# Patient Record
Sex: MALE | Race: WHITE
[De-identification: names, ages, dates, MRNs, and addresses within clinical notes are randomized per-mention and may not be internally consistent; named-entity substitution may affect disease eponyms.]

---

## 2017-07-20 ENCOUNTER — APPOINTMENT (OUTPATIENT)
Dept: INTERNAL MEDICINE | Facility: CLINIC | Age: 52
End: 2017-07-20

## 2017-07-20 ENCOUNTER — RECORD ABSTRACTING (OUTPATIENT)
Age: 52
End: 2017-07-20

## 2017-07-20 VITALS
RESPIRATION RATE: 18 BRPM | HEIGHT: 71 IN | BODY MASS INDEX: 30.52 KG/M2 | WEIGHT: 218 LBS | DIASTOLIC BLOOD PRESSURE: 82 MMHG | OXYGEN SATURATION: 97 % | SYSTOLIC BLOOD PRESSURE: 110 MMHG | TEMPERATURE: 98 F | HEART RATE: 80 BPM

## 2017-07-20 VITALS — DIASTOLIC BLOOD PRESSURE: 76 MMHG | SYSTOLIC BLOOD PRESSURE: 126 MMHG

## 2017-07-20 DIAGNOSIS — J98.4 OTHER DISORDERS OF LUNG: ICD-10-CM

## 2017-07-20 DIAGNOSIS — J06.9 ACUTE UPPER RESPIRATORY INFECTION, UNSPECIFIED: ICD-10-CM

## 2017-07-20 DIAGNOSIS — E83.19 OTHER DISORDERS OF IRON METABOLISM: ICD-10-CM

## 2017-07-20 DIAGNOSIS — G47.33 OBSTRUCTIVE SLEEP APNEA (ADULT) (PEDIATRIC): ICD-10-CM

## 2017-07-20 DIAGNOSIS — K57.92 DIVERTICULITIS OF INTESTINE, PART UNSPECIFIED, W/OUT PERFORATION OR ABSCESS W/OUT BLEEDING: ICD-10-CM

## 2017-07-20 DIAGNOSIS — R49.0 DYSPHONIA: ICD-10-CM

## 2017-07-20 DIAGNOSIS — M77.11 LATERAL EPICONDYLITIS, RIGHT ELBOW: ICD-10-CM

## 2017-07-20 DIAGNOSIS — K76.0 FATTY (CHANGE OF) LIVER, NOT ELSEWHERE CLASSIFIED: ICD-10-CM

## 2017-07-20 DIAGNOSIS — I10 ESSENTIAL (PRIMARY) HYPERTENSION: ICD-10-CM

## 2017-07-20 DIAGNOSIS — F17.200 NICOTINE DEPENDENCE, UNSPECIFIED, UNCOMPLICATED: ICD-10-CM

## 2017-07-20 DIAGNOSIS — E88.01 ALPHA-1-ANTITRYPSIN DEFICIENCY: ICD-10-CM

## 2017-07-20 DIAGNOSIS — M54.5 LOW BACK PAIN: ICD-10-CM

## 2017-07-20 DIAGNOSIS — E78.5 HYPERLIPIDEMIA, UNSPECIFIED: ICD-10-CM

## 2017-07-20 DIAGNOSIS — R09.02 HYPOXEMIA: ICD-10-CM

## 2017-07-20 DIAGNOSIS — G47.00 INSOMNIA, UNSPECIFIED: ICD-10-CM

## 2017-07-20 DIAGNOSIS — R91.1 SOLITARY PULMONARY NODULE: ICD-10-CM

## 2017-07-20 DIAGNOSIS — K44.9 DIAPHRAGMATIC HERNIA W/OUT OBSTRUCTION OR GANGRENE: ICD-10-CM

## 2017-07-20 DIAGNOSIS — I80.9 PHLEBITIS AND THROMBOPHLEBITIS OF UNSPECIFIED SITE: ICD-10-CM

## 2017-07-20 DIAGNOSIS — K21.9 GASTRO-ESOPHAGEAL REFLUX DISEASE W/OUT ESOPHAGITIS: ICD-10-CM

## 2017-07-20 RX ORDER — ATORVASTATIN CALCIUM 10 MG/1
10 TABLET, FILM COATED ORAL
Refills: 0 | Status: COMPLETED | COMMUNITY
End: 2017-07-20

## 2018-01-08 RX ORDER — UBIDECARENONE/VIT E ACET 100MG-5
CAPSULE ORAL
Refills: 0 | Status: ACTIVE | COMMUNITY

## 2018-01-08 RX ORDER — ATORVASTATIN CALCIUM 80 MG/1
80 TABLET, FILM COATED ORAL
Qty: 30 | Refills: 0 | Status: ACTIVE | COMMUNITY
Start: 2016-12-21

## 2018-01-08 RX ORDER — CHROMIUM 200 MCG
TABLET ORAL
Refills: 0 | Status: ACTIVE | COMMUNITY

## 2018-01-09 ENCOUNTER — APPOINTMENT (OUTPATIENT)
Dept: INTERNAL MEDICINE | Facility: CLINIC | Age: 53
End: 2018-01-09

## 2018-01-09 DIAGNOSIS — Z90.49 ACQUIRED ABSENCE OF OTHER SPECIFIED PARTS OF DIGESTIVE TRACT: ICD-10-CM

## 2018-03-05 ENCOUNTER — RX RENEWAL (OUTPATIENT)
Age: 53
End: 2018-03-05

## 2018-04-25 ENCOUNTER — EMERGENCY (EMERGENCY)
Facility: HOSPITAL | Age: 53
LOS: 0 days | Discharge: HOME | End: 2018-04-25
Attending: EMERGENCY MEDICINE | Admitting: EMERGENCY MEDICINE
Payer: OTHER MISCELLANEOUS

## 2018-04-25 VITALS — WEIGHT: 210.1 LBS

## 2018-04-25 VITALS
RESPIRATION RATE: 18 BRPM | HEART RATE: 91 BPM | TEMPERATURE: 98 F | DIASTOLIC BLOOD PRESSURE: 94 MMHG | SYSTOLIC BLOOD PRESSURE: 135 MMHG | OXYGEN SATURATION: 100 %

## 2018-04-25 DIAGNOSIS — M54.32 SCIATICA, LEFT SIDE: ICD-10-CM

## 2018-04-25 DIAGNOSIS — M54.9 DORSALGIA, UNSPECIFIED: ICD-10-CM

## 2018-04-25 PROCEDURE — 99284 EMERGENCY DEPT VISIT MOD MDM: CPT

## 2018-04-25 PROCEDURE — 72100 X-RAY EXAM L-S SPINE 2/3 VWS: CPT | Mod: 26

## 2018-04-25 RX ORDER — CYCLOBENZAPRINE HYDROCHLORIDE 10 MG/1
1 TABLET, FILM COATED ORAL
Qty: 15 | Refills: 0 | OUTPATIENT
Start: 2018-04-25 | End: 2018-04-29

## 2018-04-25 RX ORDER — HYDROCODONE BITARTRATE AND ACETAMINOPHEN 7.5; 325 MG/15ML; MG/15ML
1 SOLUTION ORAL
Qty: 12 | Refills: 0
Start: 2018-04-25 | End: 2018-04-27

## 2018-04-25 RX ORDER — OXYCODONE AND ACETAMINOPHEN 5; 325 MG/1; MG/1
2 TABLET ORAL ONCE
Qty: 0 | Refills: 0 | Status: DISCONTINUED | OUTPATIENT
Start: 2018-04-25 | End: 2018-04-25

## 2018-04-25 RX ORDER — CYCLOBENZAPRINE HYDROCHLORIDE 10 MG/1
1 TABLET, FILM COATED ORAL
Qty: 15 | Refills: 0
Start: 2018-04-25 | End: 2018-04-29

## 2018-04-25 RX ORDER — CYCLOBENZAPRINE HYDROCHLORIDE 10 MG/1
10 TABLET, FILM COATED ORAL ONCE
Qty: 0 | Refills: 0 | Status: COMPLETED | OUTPATIENT
Start: 2018-04-25 | End: 2018-04-25

## 2018-04-25 NOTE — ED STATDOCS - OBJECTIVE STATEMENT
52 y/o M with Hx of HTN, HLD, borderline DM,  presents to ED for evaluation of back pain. Pt states while at work today he was helping move an 800lb boiler down the stairs and suddenly he felt lower back pain. He also reports while moving this boiler he became SOB, and felt lightheaded which has since resolved. Pain exacerbated by certain movements. Pt though is able to ambulate. No radiation of pain but pt notes he felt a burning sensation in his left buttock. No Hx of back surgeries. No associated weakness, or incontinence. 3 200mg Ibuprofen taken PTA. Cardio is Kle[facility]. No current SOB or CP.

## 2018-04-25 NOTE — ED PROVIDER NOTE - ATTENDING CONTRIBUTION TO CARE
I, Milad Hubbard MD, personally saw the patient with ACP.  I have personally performed a face to face diagnostic evaluation on this patient.  I have reviewed the ACP note and agree with the history, exam, and plan of care, except as noted.

## 2018-04-25 NOTE — ED ADULT TRIAGE NOTE - CHIEF COMPLAINT QUOTE
pt states he lifted an 800lb boiler at his job and hurt his lwor back, 8/10 pan, pain with movement/ambulation, pt able to walk and drive but with discomfort, no neuro changes, no sensation changes

## 2018-04-25 NOTE — ED PROVIDER NOTE - PROGRESS NOTE DETAILS
HPI: 54yo M with PMHx of HTN, HLD, borderline DM, heart valve presents for back pain after lifting a heavy boiler down stairs at work 2 hours ago. He became SOB and lightheaded and felt sudden pain in low back that he has never had before. He also admits to L buttock burning. He iced his back and took three 200mg motrin with mild relief. The pain is exacerbated with movement. SOB and lightheadedness has resolved and he is able to bear weight and walk. He has not had the urge to urinate since the incident. Denies weakness, numbness/tingling down legs or in genital region, urinary hesitancy or incontinence. Denies chest pain, dyspnea   General: NAD, A&Ox3 Heart: RRR Lungs: clear to auscultation b/l MSK: weight bearing, normal gait, mild TTP lumbar spine, no redness, swelling, spinal deviation limited flexion due to pain Attending Haydee: PE: AAOx3 NAD, Cardiac S1S2 no mrg, Resp CTAB, Abd soft NTND, Neuro no focal deficits MSK: +lumbar ttp, ambulating well, nl bilat LE strength/sensation

## 2018-04-25 NOTE — ED PROVIDER NOTE - CARE PLAN
Principal Discharge DX:	Acute bilateral low back pain with left-sided sciatica  Goal:	medication and discharge

## 2018-09-04 ENCOUNTER — RX RENEWAL (OUTPATIENT)
Age: 53
End: 2018-09-04

## 2018-09-04 RX ORDER — RAMIPRIL 10 MG/1
10 CAPSULE ORAL DAILY
Qty: 90 | Refills: 1 | Status: ACTIVE | COMMUNITY
Start: 2018-03-05 | End: 1900-01-01

## 2019-02-16 NOTE — ED STATDOCS - PSH
Bedside shift change report given to [address] [place] [address] (oncoming nurse) by [address] (offgoing nurse). Report included the following information SBAR and Kardex. No significant past surgical history

## 2019-03-04 ENCOUNTER — RX RENEWAL (OUTPATIENT)
Age: 54
End: 2019-03-04

## 2019-03-26 ENCOUNTER — RX RENEWAL (OUTPATIENT)
Age: 54
End: 2019-03-26

## 2019-08-12 ENCOUNTER — APPOINTMENT (OUTPATIENT)
Dept: DERMATOLOGY | Facility: CLINIC | Age: 54
End: 2019-08-12

## 2020-05-06 NOTE — ED STATDOCS - CPE ED ENMT NORM
Med:  oxcrbazepine  -  Last filled on 3/23/2020 for 270 with 0 refills    LOV:  5/16/19    FUV:  5/21/2020    Patient has mail order.  Refills given per protocol.  
normal...

## 2020-12-15 PROBLEM — J06.9 UPPER RESPIRATORY INFECTION, ACUTE: Status: RESOLVED | Noted: 2017-07-20 | Resolved: 2020-12-15

## 2021-10-06 PROBLEM — I10 ESSENTIAL HYPERTENSION: Status: ACTIVE | Noted: 2017-07-20

## 2022-06-13 PROBLEM — E78.5 HYPERLIPIDEMIA, UNSPECIFIED: Chronic | Status: ACTIVE | Noted: 2018-04-26

## 2022-06-13 PROBLEM — I10 ESSENTIAL (PRIMARY) HYPERTENSION: Chronic | Status: ACTIVE | Noted: 2018-04-26

## 2022-06-17 ENCOUNTER — OUTPATIENT (OUTPATIENT)
Dept: OUTPATIENT SERVICES | Facility: HOSPITAL | Age: 57
LOS: 1 days | End: 2022-06-17
Payer: COMMERCIAL

## 2022-06-17 DIAGNOSIS — R13.19 OTHER DYSPHAGIA: ICD-10-CM

## 2022-06-17 DIAGNOSIS — R63.4 ABNORMAL WEIGHT LOSS: ICD-10-CM

## 2022-06-17 PROCEDURE — 74220 X-RAY XM ESOPHAGUS 1CNTRST: CPT | Mod: 26

## 2022-06-17 PROCEDURE — 74220 X-RAY XM ESOPHAGUS 1CNTRST: CPT

## 2022-06-18 DIAGNOSIS — R63.4 ABNORMAL WEIGHT LOSS: ICD-10-CM

## 2022-06-18 DIAGNOSIS — R13.19 OTHER DYSPHAGIA: ICD-10-CM

## 2024-06-01 ENCOUNTER — INPATIENT (INPATIENT)
Facility: HOSPITAL | Age: 59
LOS: 3 days | Discharge: HOME | DRG: 183 | End: 2024-06-05
Attending: SURGERY | Admitting: SURGERY
Payer: COMMERCIAL

## 2024-06-01 VITALS
TEMPERATURE: 99 F | HEART RATE: 104 BPM | RESPIRATION RATE: 18 BRPM | DIASTOLIC BLOOD PRESSURE: 94 MMHG | WEIGHT: 160.72 LBS | OXYGEN SATURATION: 89 % | SYSTOLIC BLOOD PRESSURE: 194 MMHG

## 2024-06-01 DIAGNOSIS — S22.39XA FRACTURE OF ONE RIB, UNSPECIFIED SIDE, INITIAL ENCOUNTER FOR CLOSED FRACTURE: ICD-10-CM

## 2024-06-01 DIAGNOSIS — Z90.49 ACQUIRED ABSENCE OF OTHER SPECIFIED PARTS OF DIGESTIVE TRACT: Chronic | ICD-10-CM

## 2024-06-01 DIAGNOSIS — M25.519 PAIN IN UNSPECIFIED SHOULDER: Chronic | ICD-10-CM

## 2024-06-01 LAB
ALBUMIN SERPL ELPH-MCNC: 4.1 G/DL — SIGNIFICANT CHANGE UP (ref 3.3–5)
ALP SERPL-CCNC: 106 U/L — SIGNIFICANT CHANGE UP (ref 40–120)
ALT FLD-CCNC: 69 U/L — SIGNIFICANT CHANGE UP (ref 12–78)
ANION GAP SERPL CALC-SCNC: 10 MMOL/L — SIGNIFICANT CHANGE UP (ref 5–17)
AST SERPL-CCNC: 25 U/L — SIGNIFICANT CHANGE UP (ref 15–37)
BASOPHILS # BLD AUTO: 0.05 K/UL — SIGNIFICANT CHANGE UP (ref 0–0.2)
BASOPHILS NFR BLD AUTO: 0.3 % — SIGNIFICANT CHANGE UP (ref 0–2)
BILIRUB SERPL-MCNC: 1 MG/DL — SIGNIFICANT CHANGE UP (ref 0.2–1.2)
BUN SERPL-MCNC: 20 MG/DL — SIGNIFICANT CHANGE UP (ref 7–23)
CALCIUM SERPL-MCNC: 9.3 MG/DL — SIGNIFICANT CHANGE UP (ref 8.5–10.1)
CHLORIDE SERPL-SCNC: 109 MMOL/L — HIGH (ref 96–108)
CO2 SERPL-SCNC: 20 MMOL/L — LOW (ref 22–31)
CREAT SERPL-MCNC: 0.8 MG/DL — SIGNIFICANT CHANGE UP (ref 0.5–1.3)
EGFR: 101 ML/MIN/1.73M2 — SIGNIFICANT CHANGE UP
EOSINOPHIL # BLD AUTO: 0.09 K/UL — SIGNIFICANT CHANGE UP (ref 0–0.5)
EOSINOPHIL NFR BLD AUTO: 0.5 % — SIGNIFICANT CHANGE UP (ref 0–6)
GLUCOSE SERPL-MCNC: 142 MG/DL — HIGH (ref 70–99)
HCT VFR BLD CALC: 46.6 % — SIGNIFICANT CHANGE UP (ref 39–50)
HGB BLD-MCNC: 15.8 G/DL — SIGNIFICANT CHANGE UP (ref 13–17)
IMM GRANULOCYTES NFR BLD AUTO: 0.5 % — SIGNIFICANT CHANGE UP (ref 0–0.9)
LACTATE SERPL-SCNC: 0.9 MMOL/L — SIGNIFICANT CHANGE UP (ref 0.7–2)
LYMPHOCYTES # BLD AUTO: 1.14 K/UL — SIGNIFICANT CHANGE UP (ref 1–3.3)
LYMPHOCYTES # BLD AUTO: 6 % — LOW (ref 13–44)
MACROCYTES BLD QL: SLIGHT — SIGNIFICANT CHANGE UP
MAGNESIUM SERPL-MCNC: 2 MG/DL — SIGNIFICANT CHANGE UP (ref 1.6–2.6)
MANUAL SMEAR VERIFICATION: SIGNIFICANT CHANGE UP
MCHC RBC-ENTMCNC: 33.1 PG — SIGNIFICANT CHANGE UP (ref 27–34)
MCHC RBC-ENTMCNC: 33.9 GM/DL — SIGNIFICANT CHANGE UP (ref 32–36)
MCV RBC AUTO: 97.5 FL — SIGNIFICANT CHANGE UP (ref 80–100)
MONOCYTES # BLD AUTO: 1.65 K/UL — HIGH (ref 0–0.9)
MONOCYTES NFR BLD AUTO: 8.7 % — SIGNIFICANT CHANGE UP (ref 2–14)
NEUTROPHILS # BLD AUTO: 15.95 K/UL — HIGH (ref 1.8–7.4)
NEUTROPHILS NFR BLD AUTO: 84 % — HIGH (ref 43–77)
NT-PROBNP SERPL-SCNC: 218 PG/ML — HIGH (ref 0–125)
PLAT MORPH BLD: NORMAL — SIGNIFICANT CHANGE UP
PLATELET # BLD AUTO: 153 K/UL — SIGNIFICANT CHANGE UP (ref 150–400)
POTASSIUM SERPL-MCNC: 4 MMOL/L — SIGNIFICANT CHANGE UP (ref 3.5–5.3)
POTASSIUM SERPL-SCNC: 4 MMOL/L — SIGNIFICANT CHANGE UP (ref 3.5–5.3)
PROT SERPL-MCNC: 7.3 GM/DL — SIGNIFICANT CHANGE UP (ref 6–8.3)
RBC # BLD: 4.78 M/UL — SIGNIFICANT CHANGE UP (ref 4.2–5.8)
RBC # FLD: 14.7 % — HIGH (ref 10.3–14.5)
RBC BLD AUTO: ABNORMAL
SODIUM SERPL-SCNC: 139 MMOL/L — SIGNIFICANT CHANGE UP (ref 135–145)
TROPONIN I, HIGH SENSITIVITY RESULT: 7.22 NG/L — SIGNIFICANT CHANGE UP
WBC # BLD: 18.97 K/UL — HIGH (ref 3.8–10.5)
WBC # FLD AUTO: 18.97 K/UL — HIGH (ref 3.8–10.5)

## 2024-06-01 PROCEDURE — 81003 URINALYSIS AUTO W/O SCOPE: CPT

## 2024-06-01 PROCEDURE — 83735 ASSAY OF MAGNESIUM: CPT

## 2024-06-01 PROCEDURE — 93010 ELECTROCARDIOGRAM REPORT: CPT

## 2024-06-01 PROCEDURE — 80048 BASIC METABOLIC PNL TOTAL CA: CPT

## 2024-06-01 PROCEDURE — 0225U NFCT DS DNA&RNA 21 SARSCOV2: CPT

## 2024-06-01 PROCEDURE — 94760 N-INVAS EAR/PLS OXIMETRY 1: CPT

## 2024-06-01 PROCEDURE — 74176 CT ABD & PELVIS W/O CONTRAST: CPT | Mod: MC

## 2024-06-01 PROCEDURE — 71275 CT ANGIOGRAPHY CHEST: CPT | Mod: 26,MC

## 2024-06-01 PROCEDURE — 36415 COLL VENOUS BLD VENIPUNCTURE: CPT

## 2024-06-01 PROCEDURE — 99223 1ST HOSP IP/OBS HIGH 75: CPT

## 2024-06-01 PROCEDURE — 80053 COMPREHEN METABOLIC PANEL: CPT

## 2024-06-01 PROCEDURE — 97116 GAIT TRAINING THERAPY: CPT | Mod: GP

## 2024-06-01 PROCEDURE — 85025 COMPLETE CBC W/AUTO DIFF WBC: CPT

## 2024-06-01 PROCEDURE — 94640 AIRWAY INHALATION TREATMENT: CPT

## 2024-06-01 PROCEDURE — 85027 COMPLETE CBC AUTOMATED: CPT

## 2024-06-01 PROCEDURE — 71045 X-RAY EXAM CHEST 1 VIEW: CPT

## 2024-06-01 PROCEDURE — 99285 EMERGENCY DEPT VISIT HI MDM: CPT

## 2024-06-01 PROCEDURE — 84100 ASSAY OF PHOSPHORUS: CPT

## 2024-06-01 PROCEDURE — 71045 X-RAY EXAM CHEST 1 VIEW: CPT | Mod: 26

## 2024-06-01 PROCEDURE — 82962 GLUCOSE BLOOD TEST: CPT

## 2024-06-01 PROCEDURE — 83036 HEMOGLOBIN GLYCOSYLATED A1C: CPT

## 2024-06-01 RX ORDER — HEPARIN SODIUM 5000 [USP'U]/ML
5000 INJECTION INTRAVENOUS; SUBCUTANEOUS EVERY 8 HOURS
Refills: 0 | Status: DISCONTINUED | OUTPATIENT
Start: 2024-06-01 | End: 2024-06-05

## 2024-06-01 RX ORDER — DEXTROSE 10 % IN WATER 10 %
125 INTRAVENOUS SOLUTION INTRAVENOUS ONCE
Refills: 0 | Status: DISCONTINUED | OUTPATIENT
Start: 2024-06-01 | End: 2024-06-05

## 2024-06-01 RX ORDER — HYDROMORPHONE HYDROCHLORIDE 2 MG/ML
0.5 INJECTION INTRAMUSCULAR; INTRAVENOUS; SUBCUTANEOUS EVERY 6 HOURS
Refills: 0 | Status: DISCONTINUED | OUTPATIENT
Start: 2024-06-01 | End: 2024-06-02

## 2024-06-01 RX ORDER — RAMIPRIL 5 MG
1 CAPSULE ORAL
Qty: 0 | Refills: 0 | DISCHARGE

## 2024-06-01 RX ORDER — GABAPENTIN 400 MG/1
100 CAPSULE ORAL EVERY 8 HOURS
Refills: 0 | Status: DISCONTINUED | OUTPATIENT
Start: 2024-06-01 | End: 2024-06-05

## 2024-06-01 RX ORDER — KETOROLAC TROMETHAMINE 30 MG/ML
15 SYRINGE (ML) INJECTION ONCE
Refills: 0 | Status: DISCONTINUED | OUTPATIENT
Start: 2024-06-01 | End: 2024-06-01

## 2024-06-01 RX ORDER — DEXTROSE 50 % IN WATER 50 %
12.5 SYRINGE (ML) INTRAVENOUS ONCE
Refills: 0 | Status: DISCONTINUED | OUTPATIENT
Start: 2024-06-01 | End: 2024-06-05

## 2024-06-01 RX ORDER — INSULIN GLARGINE 100 [IU]/ML
10 INJECTION, SOLUTION SUBCUTANEOUS EVERY MORNING
Refills: 0 | Status: DISCONTINUED | OUTPATIENT
Start: 2024-06-01 | End: 2024-06-05

## 2024-06-01 RX ORDER — DEXTROSE 50 % IN WATER 50 %
15 SYRINGE (ML) INTRAVENOUS ONCE
Refills: 0 | Status: DISCONTINUED | OUTPATIENT
Start: 2024-06-01 | End: 2024-06-05

## 2024-06-01 RX ORDER — ATORVASTATIN CALCIUM 80 MG/1
80 TABLET, FILM COATED ORAL AT BEDTIME
Refills: 0 | Status: DISCONTINUED | OUTPATIENT
Start: 2024-06-01 | End: 2024-06-05

## 2024-06-01 RX ORDER — SODIUM CHLORIDE 9 MG/ML
1000 INJECTION INTRAMUSCULAR; INTRAVENOUS; SUBCUTANEOUS ONCE
Refills: 0 | Status: COMPLETED | OUTPATIENT
Start: 2024-06-01 | End: 2024-06-01

## 2024-06-01 RX ORDER — LIDOCAINE 4 G/100G
1 CREAM TOPICAL EVERY 24 HOURS
Refills: 0 | Status: DISCONTINUED | OUTPATIENT
Start: 2024-06-01 | End: 2024-06-05

## 2024-06-01 RX ORDER — LOSARTAN POTASSIUM 100 MG/1
25 TABLET, FILM COATED ORAL DAILY
Refills: 0 | Status: DISCONTINUED | OUTPATIENT
Start: 2024-06-01 | End: 2024-06-05

## 2024-06-01 RX ORDER — IBUPROFEN 200 MG
600 TABLET ORAL EVERY 8 HOURS
Refills: 0 | Status: DISCONTINUED | OUTPATIENT
Start: 2024-06-01 | End: 2024-06-02

## 2024-06-01 RX ORDER — INSULIN LISPRO 100/ML
VIAL (ML) SUBCUTANEOUS
Refills: 0 | Status: DISCONTINUED | OUTPATIENT
Start: 2024-06-01 | End: 2024-06-05

## 2024-06-01 RX ORDER — MORPHINE SULFATE 50 MG/1
4 CAPSULE, EXTENDED RELEASE ORAL ONCE
Refills: 0 | Status: DISCONTINUED | OUTPATIENT
Start: 2024-06-01 | End: 2024-06-01

## 2024-06-01 RX ORDER — ATORVASTATIN CALCIUM 80 MG/1
1 TABLET, FILM COATED ORAL
Qty: 0 | Refills: 0 | DISCHARGE

## 2024-06-01 RX ORDER — SODIUM CHLORIDE 9 MG/ML
1000 INJECTION, SOLUTION INTRAVENOUS
Refills: 0 | Status: DISCONTINUED | OUTPATIENT
Start: 2024-06-01 | End: 2024-06-05

## 2024-06-01 RX ORDER — OLMESARTAN MEDOXOMIL 5 MG/1
2 TABLET, FILM COATED ORAL
Refills: 0 | DISCHARGE

## 2024-06-01 RX ORDER — NICOTINE POLACRILEX 2 MG
1 GUM BUCCAL DAILY
Refills: 0 | Status: DISCONTINUED | OUTPATIENT
Start: 2024-06-01 | End: 2024-06-05

## 2024-06-01 RX ORDER — UBIDECARENONE 100 MG
1 CAPSULE ORAL
Refills: 0 | DISCHARGE

## 2024-06-01 RX ORDER — EMPAGLIFLOZIN 10 MG/1
1 TABLET, FILM COATED ORAL
Refills: 0 | DISCHARGE

## 2024-06-01 RX ORDER — CHOLECALCIFEROL (VITAMIN D3) 125 MCG
1 CAPSULE ORAL
Refills: 0 | DISCHARGE

## 2024-06-01 RX ORDER — CYCLOBENZAPRINE HYDROCHLORIDE 10 MG/1
5 TABLET, FILM COATED ORAL THREE TIMES A DAY
Refills: 0 | Status: DISCONTINUED | OUTPATIENT
Start: 2024-06-01 | End: 2024-06-02

## 2024-06-01 RX ORDER — DEXTROSE 50 % IN WATER 50 %
25 SYRINGE (ML) INTRAVENOUS ONCE
Refills: 0 | Status: DISCONTINUED | OUTPATIENT
Start: 2024-06-01 | End: 2024-06-05

## 2024-06-01 RX ORDER — ATORVASTATIN CALCIUM 80 MG/1
1 TABLET, FILM COATED ORAL
Refills: 0 | DISCHARGE

## 2024-06-01 RX ORDER — SITAGLIPTIN 50 MG/1
1 TABLET, FILM COATED ORAL
Refills: 0 | DISCHARGE

## 2024-06-01 RX ORDER — GLUCAGON INJECTION, SOLUTION 0.5 MG/.1ML
1 INJECTION, SOLUTION SUBCUTANEOUS ONCE
Refills: 0 | Status: DISCONTINUED | OUTPATIENT
Start: 2024-06-01 | End: 2024-06-05

## 2024-06-01 RX ORDER — ACETAMINOPHEN 500 MG
650 TABLET ORAL EVERY 6 HOURS
Refills: 0 | Status: DISCONTINUED | OUTPATIENT
Start: 2024-06-01 | End: 2024-06-05

## 2024-06-01 RX ADMIN — Medication 15 MILLIGRAM(S): at 19:28

## 2024-06-01 RX ADMIN — SODIUM CHLORIDE 1000 MILLILITER(S): 9 INJECTION INTRAMUSCULAR; INTRAVENOUS; SUBCUTANEOUS at 21:41

## 2024-06-01 RX ADMIN — Medication 15 MILLIGRAM(S): at 20:20

## 2024-06-01 RX ADMIN — MORPHINE SULFATE 4 MILLIGRAM(S): 50 CAPSULE, EXTENDED RELEASE ORAL at 20:21

## 2024-06-01 NOTE — ED ADULT NURSE NOTE - CHIEF COMPLAINT QUOTE
pt presents to ED due to complaints of severe left sided rib cage pain s/p fall on Thursday in [facility] pt states hard to take a deep breathe swallow breaths

## 2024-06-01 NOTE — ED ADULT TRIAGE NOTE - CHIEF COMPLAINT QUOTE
pt presents to ED due to complaints of severe left sided rib cage pain s/p fall on Thursday in [facility] pt states hard to take a deep breathe swallow breaths

## 2024-06-01 NOTE — H&P ADULT - NSICDXNOFAMILYHX_GEN_ALL_CORE
Refill Approved    Rx renewed per Medication Renewal Policy. Medication was last renewed on 5/4/2018 with 3 refills.   Last office visit: 5/1/2019 with PCP Dr KAROL Miller, [facility] Triage/Med Refill 5/9/2019     Requested Prescriptions   Pending Prescriptions Disp Refills     atenolol (TENORMIN) 25 MG tablet [Pharmacy Med Name: ATENOLOL 25MG       TAB] 180 tablet 3     Sig: TAKE 1 TABLET BY MOUTH TWICE DAILY       Beta-Blockers Refill Protocol Passed - 5/9/2019 12:09 PM        Passed - PCP or prescribing provider visit in past 12 months or next 3 months     Last office visit with prescriber/PCP: Visit date not found OR same dept: 5/1/2019 Sang Owens MD OR same specialty: 5/1/2019 Sang Owens MD  Last physical: Visit date not found Last MTM visit: Visit date not found   Next visit within 3 mo: Visit date not found  Next physical within 3 mo: Visit date not found  Prescriber OR PCP: Georgi Lopez MD  Last diagnosis associated with med order: 1. Essential hypertension  - atenolol (TENORMIN) 25 MG tablet [Pharmacy Med Name: ATENOLOL 25MG       TAB]; TAKE 1 TABLET BY MOUTH TWICE DAILY  Dispense: 180 tablet; Refill: 3    If protocol passes may refill for 12 months if within 3 months of last provider visit (or a total of 15 months).             Passed - Blood pressure filed in past 12 months     BP Readings from Last 1 Encounters:   05/01/19 138/86                          <-- Click to add NO pertinent Family History

## 2024-06-01 NOTE — H&P ADULT - HISTORY OF PRESENT ILLNESS
Trauma Consult  Pt presents to ED s/p mechanical fall while on vacation in [facility] on 5/30/24. [place] returned to [place] 6[place]1/24 and presented to [facility] for evaluation of left rib/chest pain post fall. Pt denied headstrike or LOC. Pt denied any other complaints.   Pt seen and examined at bedside with chaperone. Pt is AAOx3, pt in no acute distress. Pt denied c/o fever, chills, abd pain, N/V/D, extremity pain or dysfunction, hemoptysis, hematemesis, hematuria, hematochexia, headache, diplopia, vertigo, dizzyness. Pt tolerating diet, (+) void, (+) ambulation, (+) bowel function

## 2024-06-01 NOTE — ED PROVIDER NOTE - NS_ ATTENDINGSCRIBEDETAILS _ED_A_ED_FT
I, Jessica Roa DO,  performed the initial face to face bedside interview with this patient regarding history of present illness, review of symptoms and relevant past medical, social and family history.  I completed an independent physical examination.  I was the initial provider who evaluated this patient.   I personally saw the patient and performed a substantive portion of the visit including all aspects of the medical decision making.  The history, relevant review of systems, past medical and surgical history, medical decision making, and physical examination was documented by the scribe in my presence and I attest to the accuracy of the documentation.

## 2024-06-01 NOTE — H&P ADULT - NSHPPHYSICALEXAM_GEN_ALL_CORE
GCS of 15  Airway is patent  Breathing is symmetric and unlabored  Neuro: CNII-XII grossly intact  Psych: normal affect  HEENT: Normocephalic, atraumatic, VANESSA, EOM wnl, no otorrhea or hemotympanum b/l, no epistaxis or d/c b/l nares, no craniofacial bony pathology or tenderness b/l  Neck: Soft and supple, nontender to passive/active ROM exam. No crepitus, no ecchymosis, no hematoma, to exam, no JVD, no tracheal deviation  Cspine/thoracolumbrosacral spine: no gross bony pathology or tenderness to exam  Cardiovascular: S1S2 Present  Chest: no gross right rib pathology or tenderness to exam. + tenderness to left 6-9th rib region from known fracture pathology. No sternal pathology or tenderness to exam. No crepitus, no ecchymosis, no hematoma. No penetrating thorcoabdominal trauma  Respiratory: Rate is 18; Respiratory Effort normal; no wheezes, rales or rhonchi to exam  ABD: bowel sounds (+), soft, nontender, non distended, no rebound, no guarding, no rigidity, no skin changes to exam. No pelvic instability to exam, no skin changes  Musculoskeletal: Pt has palpable b/l radial, femoral, dorsalis pedis pulses. All digits are warm and well perfused. No gross long bone pathology or tenderness to exam. Pt demonstrates grossly intact sensoromotor function. Pt has good capillary refill to digits, no calf edema or tenderness to exam.  Skin: no lesions or rashes to exam  ICU Vital Signs Last 24 Hrs  T(C): 37.7 (01 Jun 2024 21:51), Max: 37.7 (01 Jun 2024 21:51)  T(F): 99.8 (01 Jun 2024 21:51), Max: 99.8 (01 Jun 2024 21:51)  HR: 83 (01 Jun 2024 21:51) (83 - 104)  BP: 161/91 (01 Jun 2024 21:51) (161/91 - 194/94)  BP(mean): 118 (01 Jun 2024 18:50) (118 - 118)  ABP: --  ABP(mean): --  RR: 20 (01 Jun 2024 21:51) (18 - 20)  SpO2: 98% (01 Jun 2024 21:51) (89% - 98%)    O2 Parameters below as of 01 Jun 2024 21:51  Patient On (Oxygen Delivery Method): nasal cannula  O2 Flow (L/min): 3

## 2024-06-01 NOTE — ED ADULT NURSE REASSESSMENT NOTE - NS ED NURSE REASSESS COMMENT FT1
Pt received by RN Hector Horne. Introduced self to pt and updated pt on plan of care. Pt verbalized understanding. Pt awaiting for bed at this time. Pt given extra urinals as requested. Bed at lowest height, call bell within reach, AOx4, endorsing some pain with cough.

## 2024-06-01 NOTE — ED ADULT NURSE NOTE - OBJECTIVE STATEMENT
Pt slipped and fell  on Thursday on vacation and land on his back c/o Left rib cage pain ,pt flew from [facility] today and arrived to the ED .pt  heaving difficulty to cough  because of pain . h/o HTN ,DIABETES. Pt slipped and fell  on Thursday on vacation and land on his back c/o Left rib cage pain ,pt flew from [facility] today and arrived to the ED .pt  heaving difficulty to cough  because of pain . h/o HTN ,DIABETES. smokes  marijuana every day.

## 2024-06-01 NOTE — ED ADULT NURSE REASSESSMENT NOTE - NS ED NURSE REASSESS COMMENT FT1
Pt was seen by Dr Perez ,iv abx started .Incentive spirometer was given to pt ,explained pt how to use it.

## 2024-06-01 NOTE — ED PROVIDER NOTE - PROGRESS NOTE DETAILS
Yadiel Singh for ED attending, Dr. Roa: CTA with left 6-9 rib fractures, likely bronchopneumonia/pleural effusion. trauma consulted, will give antibiotics given likely PNA and WBC of 18.97 This patient is being admitted as an inpatient and the hospital admission will be required for at least 2 midnights.  This admission and its relevant details have been verbally relayed to the Admitting Team. Dr. Herman Trauma attending

## 2024-06-01 NOTE — ED PROVIDER NOTE - PHYSICAL EXAMINATION
General: Patient in no acute distress, AAOX3.   HENMT: NC/AT, no nasal congestion, MMM, no lacerations   Neck: supple  CVS: regular rate and rhythm, no murmur  Resp/chest wall: Good air entry bilaterally, No wheeze/rhonchi, left sided lateral lower rib ttp no erythema no ecchymosis ,no crepitus  Abd: Soft non tender, non distended, +bowel sounds, No guarding, rebound tenderness   Ext: FROM in all ext, 2+ pulses throughout, cap refill<2 sec, no calf tenderness, no lower extremity edema   BACK: no midline tenderness, no stepoffs, no CVA ttp  NEURO: no focal deficit, gross motor and sensory intact throughout, gait stable.

## 2024-06-01 NOTE — ED PROVIDER NOTE - OBJECTIVE STATEMENT
60 y/o male with PMHx of HTN, HLD, DM presents to the ED c/o left sided rib pain and difficulty breathing s/p fall in [facility] on Thursday. Pt states he slipped on wet surface near pool no head trauma and landed on left sided. Pt states pain gradually worse and worse with deep breath. Pt has been ambulating since then. Pt found to be hypoxic in ED., no leg pain, no chest pain, no numbness/tingling/weakness of extremities, no abdominal pain, no N/V/D

## 2024-06-01 NOTE — H&P ADULT - NSICDXPASTSURGICALHX_GEN_ALL_CORE_FT
PAST SURGICAL HISTORY:  History of bowel resection     Shoulder pain with history of repair of rotator cuff

## 2024-06-01 NOTE — H&P ADULT - NSHPLABSRESULTS_GEN_ALL_CORE
Labs:                      15.8   18.97 )-----------( 153      ( 01 Jun 2024 19:23 )             46.6   CBC Full  -  ( 01 Jun 2024 19:23 )  WBC Count : 18.97 K/uL  RBC Count : 4.78 M/uL  Hemoglobin : 15.8 g/dL  Hematocrit : 46.6 %  Platelet Count - Automated : 153 K/uL  Mean Cell Volume : 97.5 fl  Mean Cell Hemoglobin : 33.1 pg  Mean Cell Hemoglobin Concentration : 33.9 gm/dL  Auto Neutrophil # : 15.95 K/uL  Auto Lymphocyte # : 1.14 K/uL  Auto Monocyte # : 1.65 K/uL  Auto Eosinophil # : 0.09 K/uL  Auto Basophil # : 0.05 K/uL  Auto Neutrophil % : 84.0 %  Auto Lymphocyte % : 6.0 %  Auto Monocyte % : 8.7 %  Auto Eosinophil % : 0.5 %  Auto Basophil % : 0.3 %  139  |  109<H>  |  20  ----------------------------<  142<H>  4.0   |  20<L>  |  0.80  Ca    9.3      01 Jun 2024 19:23  Mg     2.0     06-01  TPro  7.3  /  Alb  4.1  /  TBili  1.0  /  DBili  x   /  AST  25  /  ALT  69  /  AlkPhos  106  06-01  LIVER FUNCTIONS - ( 01 Jun 2024 19:23 )  Alb: 4.1 g/dL / Pro: 7.3 gm/dL / ALK PHOS: 106 U/L / ALT: 69 U/L / AST: 25 U/L / GGT: x           Radiology:  < from: CT Angio Chest PE Protocol w/ IV Cont (06.01.24 @ 20:00) >    ACC: [id number] EXAM:  CT ANGIO CHEST PULM ART [facility]   ORDERED BY: SHERRY FUENTES     PROCEDURE DATE:  06/01/2024          INTERPRETATION:  CLINICAL INFORMATION: Shortness of breath.    COMPARISON: 8/11/2015.      < end of copied text >    < from: CT Angio Chest PE Protocol w/ IV Cont (06.01.24 @ 20:00) >    IMPRESSION:  No pulmonary embolism.    Acute appearing mildly displaced left posterior sixth through ninth rib   fractures. No pneumothorax.    Peribronchial thickening with mucoid plugging of bilateral lower lobe   bronchi and lingular bronchi with asymmetric patchy airspace opacity in   the left lower lobe likely representing bronchopneumonia. Trace left   pleural effusion.      < end of copied text >

## 2024-06-01 NOTE — ED PROVIDER NOTE - CLINICAL SUMMARY MEDICAL DECISION MAKING FREE TEXT BOX
58 y/o male with PMHx of HTN, HLD, DM presents to the ED c/o left sided rib pain and difficulty breathing s/p fall in [facility] on Thursday. will r/o fracture vs pneumothorax vs PE given SOB and recent air travel, plan for labs, imaging, reassess.

## 2024-06-01 NOTE — H&P ADULT - ASSESSMENT
A/P:  Left 6-9th rib fractures s/p mechanical fall 5/30/24  Possible pneumonia  IV antibiotics  Incentive spirometry  Monitor pulse ox and HD status  Hospitalist consult for med mgmt  GI/DVT prophylaxis  Pain control  F/U labs, radiologic studies  Pt will be monitored for signs of evolution/resolution of pathology and surgical intervention as required and warranted  Pt aware of and agrees with all of the above    75 minuted of time spent on pt examination, review of relevant labs and radiologic studies, assured stabilization of pt, discussion with relevant services/providers for coordination of pt care and services

## 2024-06-02 LAB
A1C WITH ESTIMATED AVERAGE GLUCOSE RESULT: 6.7 % — HIGH (ref 4–5.6)
ALBUMIN SERPL ELPH-MCNC: 3.3 G/DL — SIGNIFICANT CHANGE UP (ref 3.3–5)
ALP SERPL-CCNC: 91 U/L — SIGNIFICANT CHANGE UP (ref 40–120)
ALT FLD-CCNC: 54 U/L — SIGNIFICANT CHANGE UP (ref 12–78)
ANION GAP SERPL CALC-SCNC: 9 MMOL/L — SIGNIFICANT CHANGE UP (ref 5–17)
APPEARANCE UR: CLEAR — SIGNIFICANT CHANGE UP
AST SERPL-CCNC: 15 U/L — SIGNIFICANT CHANGE UP (ref 15–37)
BASOPHILS # BLD AUTO: 0.05 K/UL — SIGNIFICANT CHANGE UP (ref 0–0.2)
BASOPHILS NFR BLD AUTO: 0.2 % — SIGNIFICANT CHANGE UP (ref 0–2)
BILIRUB SERPL-MCNC: 1.3 MG/DL — HIGH (ref 0.2–1.2)
BILIRUB UR-MCNC: NEGATIVE — SIGNIFICANT CHANGE UP
BUN SERPL-MCNC: 14 MG/DL — SIGNIFICANT CHANGE UP (ref 7–23)
CALCIUM SERPL-MCNC: 8.8 MG/DL — SIGNIFICANT CHANGE UP (ref 8.5–10.1)
CHLORIDE SERPL-SCNC: 106 MMOL/L — SIGNIFICANT CHANGE UP (ref 96–108)
CO2 SERPL-SCNC: 23 MMOL/L — SIGNIFICANT CHANGE UP (ref 22–31)
COLOR SPEC: YELLOW — SIGNIFICANT CHANGE UP
CREAT SERPL-MCNC: 0.74 MG/DL — SIGNIFICANT CHANGE UP (ref 0.5–1.3)
DIFF PNL FLD: NEGATIVE — SIGNIFICANT CHANGE UP
EGFR: 103 ML/MIN/1.73M2 — SIGNIFICANT CHANGE UP
EOSINOPHIL # BLD AUTO: 0.03 K/UL — SIGNIFICANT CHANGE UP (ref 0–0.5)
EOSINOPHIL NFR BLD AUTO: 0.1 % — SIGNIFICANT CHANGE UP (ref 0–6)
ESTIMATED AVERAGE GLUCOSE: 146 MG/DL — HIGH (ref 68–114)
GLUCOSE BLDC GLUCOMTR-MCNC: 131 MG/DL — HIGH (ref 70–99)
GLUCOSE BLDC GLUCOMTR-MCNC: 146 MG/DL — HIGH (ref 70–99)
GLUCOSE BLDC GLUCOMTR-MCNC: 188 MG/DL — HIGH (ref 70–99)
GLUCOSE SERPL-MCNC: 131 MG/DL — HIGH (ref 70–99)
GLUCOSE UR QL: >=1000 MG/DL
HCT VFR BLD CALC: 44.6 % — SIGNIFICANT CHANGE UP (ref 39–50)
HGB BLD-MCNC: 15.2 G/DL — SIGNIFICANT CHANGE UP (ref 13–17)
IMM GRANULOCYTES NFR BLD AUTO: 0.7 % — SIGNIFICANT CHANGE UP (ref 0–0.9)
KETONES UR-MCNC: 40 MG/DL
LEUKOCYTE ESTERASE UR-ACNC: NEGATIVE — SIGNIFICANT CHANGE UP
LYMPHOCYTES # BLD AUTO: 1.14 K/UL — SIGNIFICANT CHANGE UP (ref 1–3.3)
LYMPHOCYTES # BLD AUTO: 5.2 % — LOW (ref 13–44)
MAGNESIUM SERPL-MCNC: 1.8 MG/DL — SIGNIFICANT CHANGE UP (ref 1.6–2.6)
MCHC RBC-ENTMCNC: 33.6 PG — SIGNIFICANT CHANGE UP (ref 27–34)
MCHC RBC-ENTMCNC: 34.1 GM/DL — SIGNIFICANT CHANGE UP (ref 32–36)
MCV RBC AUTO: 98.7 FL — SIGNIFICANT CHANGE UP (ref 80–100)
MONOCYTES # BLD AUTO: 1.88 K/UL — HIGH (ref 0–0.9)
MONOCYTES NFR BLD AUTO: 8.6 % — SIGNIFICANT CHANGE UP (ref 2–14)
NEUTROPHILS # BLD AUTO: 18.71 K/UL — HIGH (ref 1.8–7.4)
NEUTROPHILS NFR BLD AUTO: 85.2 % — HIGH (ref 43–77)
NITRITE UR-MCNC: NEGATIVE — SIGNIFICANT CHANGE UP
PH UR: 5 — SIGNIFICANT CHANGE UP (ref 5–8)
PHOSPHATE SERPL-MCNC: 3.2 MG/DL — SIGNIFICANT CHANGE UP (ref 2.5–4.5)
PLATELET # BLD AUTO: 137 K/UL — LOW (ref 150–400)
POTASSIUM SERPL-MCNC: 3.7 MMOL/L — SIGNIFICANT CHANGE UP (ref 3.5–5.3)
POTASSIUM SERPL-SCNC: 3.7 MMOL/L — SIGNIFICANT CHANGE UP (ref 3.5–5.3)
PROT SERPL-MCNC: 6.4 GM/DL — SIGNIFICANT CHANGE UP (ref 6–8.3)
PROT UR-MCNC: NEGATIVE MG/DL — SIGNIFICANT CHANGE UP
RAPID RVP RESULT: SIGNIFICANT CHANGE UP
RBC # BLD: 4.52 M/UL — SIGNIFICANT CHANGE UP (ref 4.2–5.8)
RBC # FLD: 14.6 % — HIGH (ref 10.3–14.5)
SARS-COV-2 RNA SPEC QL NAA+PROBE: SIGNIFICANT CHANGE UP
SODIUM SERPL-SCNC: 138 MMOL/L — SIGNIFICANT CHANGE UP (ref 135–145)
SP GR SPEC: >1.03 — HIGH (ref 1–1.03)
UROBILINOGEN FLD QL: 0.2 MG/DL — SIGNIFICANT CHANGE UP (ref 0.2–1)
WBC # BLD: 21.97 K/UL — HIGH (ref 3.8–10.5)
WBC # FLD AUTO: 21.97 K/UL — HIGH (ref 3.8–10.5)

## 2024-06-02 PROCEDURE — 71045 X-RAY EXAM CHEST 1 VIEW: CPT | Mod: 26

## 2024-06-02 PROCEDURE — 99222 1ST HOSP IP/OBS MODERATE 55: CPT

## 2024-06-02 PROCEDURE — 74176 CT ABD & PELVIS W/O CONTRAST: CPT | Mod: 26

## 2024-06-02 PROCEDURE — 99232 SBSQ HOSP IP/OBS MODERATE 35: CPT

## 2024-06-02 RX ORDER — IBUPROFEN 200 MG
600 TABLET ORAL THREE TIMES A DAY
Refills: 0 | Status: DISCONTINUED | OUTPATIENT
Start: 2024-06-02 | End: 2024-06-05

## 2024-06-02 RX ORDER — METRONIDAZOLE 500 MG
500 TABLET ORAL ONCE
Refills: 0 | Status: COMPLETED | OUTPATIENT
Start: 2024-06-02 | End: 2024-06-02

## 2024-06-02 RX ORDER — ALBUTEROL 90 UG/1
2.5 AEROSOL, METERED ORAL EVERY 6 HOURS
Refills: 0 | Status: DISCONTINUED | OUTPATIENT
Start: 2024-06-02 | End: 2024-06-02

## 2024-06-02 RX ORDER — IPRATROPIUM/ALBUTEROL SULFATE 18-103MCG
3 AEROSOL WITH ADAPTER (GRAM) INHALATION EVERY 6 HOURS
Refills: 0 | Status: DISCONTINUED | OUTPATIENT
Start: 2024-06-02 | End: 2024-06-05

## 2024-06-02 RX ORDER — METRONIDAZOLE 500 MG
500 TABLET ORAL EVERY 8 HOURS
Refills: 0 | Status: DISCONTINUED | OUTPATIENT
Start: 2024-06-02 | End: 2024-06-02

## 2024-06-02 RX ORDER — ACETYLCYSTEINE 200 MG/ML
4 VIAL (ML) MISCELLANEOUS EVERY 6 HOURS
Refills: 0 | Status: DISCONTINUED | OUTPATIENT
Start: 2024-06-02 | End: 2024-06-05

## 2024-06-02 RX ORDER — METRONIDAZOLE 500 MG
500 TABLET ORAL EVERY 8 HOURS
Refills: 0 | Status: DISCONTINUED | OUTPATIENT
Start: 2024-06-02 | End: 2024-06-03

## 2024-06-02 RX ORDER — HYDROMORPHONE HYDROCHLORIDE 2 MG/ML
1 INJECTION INTRAMUSCULAR; INTRAVENOUS; SUBCUTANEOUS EVERY 6 HOURS
Refills: 0 | Status: DISCONTINUED | OUTPATIENT
Start: 2024-06-02 | End: 2024-06-03

## 2024-06-02 RX ORDER — METRONIDAZOLE 500 MG
TABLET ORAL
Refills: 0 | Status: DISCONTINUED | OUTPATIENT
Start: 2024-06-02 | End: 2024-06-02

## 2024-06-02 RX ORDER — CYCLOBENZAPRINE HYDROCHLORIDE 10 MG/1
10 TABLET, FILM COATED ORAL THREE TIMES A DAY
Refills: 0 | Status: DISCONTINUED | OUTPATIENT
Start: 2024-06-02 | End: 2024-06-05

## 2024-06-02 RX ORDER — TRAMADOL HYDROCHLORIDE 50 MG/1
50 TABLET ORAL
Refills: 0 | Status: DISCONTINUED | OUTPATIENT
Start: 2024-06-02 | End: 2024-06-05

## 2024-06-02 RX ADMIN — GABAPENTIN 100 MILLIGRAM(S): 400 CAPSULE ORAL at 13:24

## 2024-06-02 RX ADMIN — LOSARTAN POTASSIUM 25 MILLIGRAM(S): 100 TABLET, FILM COATED ORAL at 06:19

## 2024-06-02 RX ADMIN — HEPARIN SODIUM 5000 UNIT(S): 5000 INJECTION INTRAVENOUS; SUBCUTANEOUS at 06:22

## 2024-06-02 RX ADMIN — Medication 100 MILLIGRAM(S): at 07:38

## 2024-06-02 RX ADMIN — ALBUTEROL 2.5 MILLIGRAM(S): 90 AEROSOL, METERED ORAL at 14:23

## 2024-06-02 RX ADMIN — HYDROMORPHONE HYDROCHLORIDE 0.5 MILLIGRAM(S): 2 INJECTION INTRAMUSCULAR; INTRAVENOUS; SUBCUTANEOUS at 08:02

## 2024-06-02 RX ADMIN — ALBUTEROL 2.5 MILLIGRAM(S): 90 AEROSOL, METERED ORAL at 05:46

## 2024-06-02 RX ADMIN — Medication 100 MILLIGRAM(S): at 21:47

## 2024-06-02 RX ADMIN — Medication 100 MILLIGRAM(S): at 21:42

## 2024-06-02 RX ADMIN — Medication 1 PATCH: at 08:05

## 2024-06-02 RX ADMIN — Medication 3 MILLILITER(S): at 19:57

## 2024-06-02 RX ADMIN — HEPARIN SODIUM 5000 UNIT(S): 5000 INJECTION INTRAVENOUS; SUBCUTANEOUS at 13:18

## 2024-06-02 RX ADMIN — Medication 1: at 11:53

## 2024-06-02 RX ADMIN — LIDOCAINE 1 PATCH: 4 CREAM TOPICAL at 01:10

## 2024-06-02 RX ADMIN — Medication 100 MILLIGRAM(S): at 13:18

## 2024-06-02 RX ADMIN — Medication 1200 MILLIGRAM(S): at 21:45

## 2024-06-02 RX ADMIN — CYCLOBENZAPRINE HYDROCHLORIDE 10 MILLIGRAM(S): 10 TABLET, FILM COATED ORAL at 21:43

## 2024-06-02 RX ADMIN — HEPARIN SODIUM 5000 UNIT(S): 5000 INJECTION INTRAVENOUS; SUBCUTANEOUS at 21:45

## 2024-06-02 RX ADMIN — LIDOCAINE 1 PATCH: 4 CREAM TOPICAL at 07:00

## 2024-06-02 RX ADMIN — ATORVASTATIN CALCIUM 80 MILLIGRAM(S): 80 TABLET, FILM COATED ORAL at 21:43

## 2024-06-02 RX ADMIN — INSULIN GLARGINE 10 UNIT(S): 100 INJECTION, SOLUTION SUBCUTANEOUS at 08:01

## 2024-06-02 RX ADMIN — LIDOCAINE 1 PATCH: 4 CREAM TOPICAL at 21:45

## 2024-06-02 RX ADMIN — LIDOCAINE 1 PATCH: 4 CREAM TOPICAL at 13:00

## 2024-06-02 RX ADMIN — TRAMADOL HYDROCHLORIDE 50 MILLIGRAM(S): 50 TABLET ORAL at 13:26

## 2024-06-02 RX ADMIN — HYDROMORPHONE HYDROCHLORIDE 0.5 MILLIGRAM(S): 2 INJECTION INTRAMUSCULAR; INTRAVENOUS; SUBCUTANEOUS at 01:20

## 2024-06-02 RX ADMIN — HYDROMORPHONE HYDROCHLORIDE 1 MILLIGRAM(S): 2 INJECTION INTRAMUSCULAR; INTRAVENOUS; SUBCUTANEOUS at 17:17

## 2024-06-02 NOTE — CONSULT NOTE ADULT - SUBJECTIVE AND OBJECTIVE BOX
HPI:    59 y.o.m s/p mechanical fall while on vacation in [facility] on 5/30/24. Pt [place] to [place] 6/1/24 and presented to [place] ED for evaluation of left rib/chest pain post fall. Pt denied headstrike or LOC. Pt denied any other complaints. Pt is AAOx3, pt in no acute distress. Pt denied c/o fever, chills, abd pain, N/V/D, extremity pain or dysfunction, hemoptysis, hematemesis, hematuria, hematochexia, headache, diplopia, vertigo, dizzyness. Pt tolerating diet, (+) void, (+) ambulation, (+) bowel function, pat seen for pulmonary eval for pneumonia.      PAST MEDICAL & SURGICAL HISTORY:  HTN (hypertension)      HLD (hyperlipidemia)      Shoulder pain with history of repair of rotator cuff      History of bowel resection          Home Medications:  atorvastatin 80 mg oral tablet: 1 tab(s) orally once a day (01 Jun 2024 22:48)  cholecalciferol 25 mcg (1000 intl units) oral tablet: 1 tab(s) orally once a day (01 Jun 2024 22:47)  CoQ10 300 mg oral capsule: 1 cap(s) orally once a day (01 Jun 2024 22:47)  Januvia 100 mg oral tablet: 1 tab(s) orally once a day (01 Jun 2024 22:47)  Jardiance 25 mg oral tablet: 1 tab(s) orally once a day (01 Jun 2024 22:47)  olmesartan 5 mg oral tablet: 2 tab(s) orally once a day (01 Jun 2024 22:47)      MEDICATIONS  (STANDING):  albuterol/ipratropium for Nebulization 3 milliLiter(s) Nebulizer every 6 hours  atorvastatin 80 milliGRAM(s) Oral at bedtime  benzonatate 100 milliGRAM(s) Oral three times a day  dextrose 10% Bolus 125 milliLiter(s) IV Bolus once  dextrose 5%. 1000 milliLiter(s) (100 mL/Hr) IV Continuous <Continuous>  dextrose 5%. 1000 milliLiter(s) (50 mL/Hr) IV Continuous <Continuous>  dextrose 50% Injectable 25 Gram(s) IV Push once  dextrose 50% Injectable 12.5 Gram(s) IV Push once  glucagon  Injectable 1 milliGRAM(s) IntraMuscular once  guaiFENesin  milliGRAM(s) Oral every 12 hours  heparin   Injectable 5000 Unit(s) SubCutaneous every 8 hours  insulin glargine Injectable (LANTUS) 10 Unit(s) SubCutaneous every morning  insulin lispro (ADMELOG) corrective regimen sliding scale   SubCutaneous three times a day before meals  levoFLOXacin IVPB 750 milliGRAM(s) IV Intermittent every 24 hours  lidocaine   4% Patch 1 Patch Transdermal every 24 hours  losartan 25 milliGRAM(s) Oral daily  metroNIDAZOLE  IVPB 500 milliGRAM(s) IV Intermittent every 8 hours  nicotine -   7 mG/24Hr(s) Patch 1 Patch Transdermal daily    MEDICATIONS  (PRN):  acetaminophen     Tablet .. 650 milliGRAM(s) Oral every 6 hours PRN Mild Pain (1 - 3)  cyclobenzaprine 10 milliGRAM(s) Oral three times a day PRN Muscle Spasm  dextrose Oral Gel 15 Gram(s) Oral once PRN Blood Glucose LESS THAN 70 milliGRAM(s)/deciliter  gabapentin 100 milliGRAM(s) Oral every 8 hours PRN nerve pain  HYDROmorphone  Injectable 1 milliGRAM(s) IV Push every 6 hours PRN Severe Pain (7 - 10)  ibuprofen  Tablet. 600 milliGRAM(s) Oral three times a day PRN Mild Pain (1 - 3)  LORazepam   Injectable 2 milliGRAM(s) IV Push every 1 hour PRN CIWA-Ar score 8 or greater  traMADol 50 milliGRAM(s) Oral two times a day PRN Moderate Pain (4 - 6)      Allergies    amoxicillin (Unknown)  Originally Entered as [Severe Rash] reaction to [adhesive tape] (Unknown)    Intolerances        SOCIAL HISTORY: Denies tobacco, etoh abuse or illicit drug use    FAMILY HISTORY:  No pertinent family history in first degree relatives        Vital Signs Last 24 Hrs  T(C): 37.2 (02 Jun 2024 15:52), Max: 37.8 (02 Jun 2024 01:01)  T(F): 98.9 (02 Jun 2024 15:52), Max: 100 (02 Jun 2024 01:01)  HR: 84 (02 Jun 2024 15:52) (83 - 95)  BP: 141/69 (02 Jun 2024 15:52) (141/69 - 176/68)  BP(mean): 92 (02 Jun 2024 06:27) (92 - 98)  RR: 18 (02 Jun 2024 15:52) (18 - 20)  SpO2: 97% (02 Jun 2024 15:52) (92% - 98%)    Parameters below as of 02 Jun 2024 15:52  Patient On (Oxygen Delivery Method): nasal cannula  O2 Flow (L/min): 3          REVIEW OF SYSTEMS:    CONSTITUTIONAL:  As per HPI.  HEENT:  Eyes:  No diplopia or blurred vision. ENT:  No earache, sore throat or runny nose.  CARDIOVASCULAR:  No pressure, squeezing, tightness, heaviness or aching about the chest, neck, axilla or epigastrium.  RESPIRATORY:  No cough, shortness of breath, PND or orthopnea.  GASTROINTESTINAL:  No nausea, vomiting or diarrhea.  GENITOURINARY:  No dysuria, frequency or urgency.  MUSCULOSKELETAL:  As per HPI.  SKIN:  No change in skin, hair or nails.  NEUROLOGIC:  No paresthesias, fasciculations, seizures or weakness.  PSYCHIATRIC:  No disorder of thought or mood.  ENDOCRINE:  No heat or cold intolerance, polyuria or polydipsia.  HEMATOLOGICAL:  No easy bruising or bleedings:  .     PHYSICAL EXAMINATION:    GENERAL APPEARANCE:  Pt. is not currently dyspneic, in no distress. Pt. is alert, oriented, and pleasant.  HEENT:  Pupils are normal and react normally. No icterus. Mucous membranes well colored.  NECK:  Supple. No lymphadenopathy. Jugular venous pressure not elevated. Carotids equal.   HEART:   The cardiac impulse has a normal quality. Regular. Normal S1 and S2. There are no murmurs, rubs or gallops noted  CHEST:  Chest is clear to auscultation. Normal respiratory effort.  ABDOMEN:  Soft and nontender.   EXTREMITIES:  There is no cyanosis, clubbing or edema.   SKIN:  No rash or significant lesions are noted.    LABS:                        15.2   21.97 )-----------( 137      ( 02 Jun 2024 09:12 )             44.6     06-02    138  |  106  |  14  ----------------------------<  131<H>  3.7   |  23  |  0.74    Ca    8.8      02 Jun 2024 09:12  Phos  3.2     06-02  Mg     1.8     06-02    TPro  6.4  /  Alb  3.3  /  TBili  1.3<H>  /  DBili  x   /  AST  15  /  ALT  54  /  AlkPhos  91  06-02    LIVER FUNCTIONS - ( 02 Jun 2024 09:12 )  Alb: 3.3 g/dL / Pro: 6.4 gm/dL / ALK PHOS: 91 U/L / ALT: 54 U/L / AST: 15 U/L / GGT: x                 Urinalysis Basic - ( 02 Jun 2024 09:12 )    Color: x / Appearance: x / SG: x / pH: x  Gluc: 131 mg/dL / Ketone: x  / Bili: x / Urobili: x   Blood: x / Protein: x / Nitrite: x   Leuk Esterase: x / RBC: x / WBC x   Sq Epi: x / Non Sq Epi: x / Bacteria: x            RADIOLOGY & ADDITIONAL STUDIES:       CT Angio Chest PE Protocol w/ IV Cont (06.01.24 @ 20:00) >  IMPRESSION:  No pulmonary embolism.    Acute appearing mildly displaced left posterior sixth through ninth rib   fractures. No pneumothorax.    Peribronchial thickening with mucoid plugging of bilateral lower lobe   bronchi and lingular bronchi with asymmetric patchy airspace opacity in   the left lower lobe likely representing bronchopneumonia. Trace left   pleural effusion.         HPI:    59 y.o.m s/p mechanical fall while on vacation in [facility] on 5/30/24. [place] 6/1/24 and presented to [place] ED for evaluation of left rib/chest pain post fall. Pt denied headstrike or LOC. Pt denied any other complaints. Pt is AAOx3, pt in no acute distress. Pt denied c/o fever, chills, abd pain, N/V/D, extremity pain or dysfunction, hemoptysis, hematemesis, hematuria, hematochexia, headache, diplopia, vertigo, dizzyness. Pt tolerating diet, (+) void, (+) ambulation, (+) bowel function, pat seen for pulmonary eval for pneumonia. pat sitting in bed, feeling better, pain on deep breathing.      PAST MEDICAL & SURGICAL HISTORY:  HTN (hypertension)      HLD (hyperlipidemia)      Shoulder pain with history of repair of rotator cuff      History of bowel resection          Home Medications:  atorvastatin 80 mg oral tablet: 1 tab(s) orally once a day (01 Jun 2024 22:48)  cholecalciferol 25 mcg (1000 intl units) oral tablet: 1 tab(s) orally once a day (01 Jun 2024 22:47)  CoQ10 300 mg oral capsule: 1 cap(s) orally once a day (01 Jun 2024 22:47)  Januvia 100 mg oral tablet: 1 tab(s) orally once a day (01 Jun 2024 22:47)  Jardiance 25 mg oral tablet: 1 tab(s) orally once a day (01 Jun 2024 22:47)  olmesartan 5 mg oral tablet: 2 tab(s) orally once a day (01 Jun 2024 22:47)      MEDICATIONS  (STANDING):  albuterol/ipratropium for Nebulization 3 milliLiter(s) Nebulizer every 6 hours  atorvastatin 80 milliGRAM(s) Oral at bedtime  benzonatate 100 milliGRAM(s) Oral three times a day  dextrose 10% Bolus 125 milliLiter(s) IV Bolus once  dextrose 5%. 1000 milliLiter(s) (100 mL/Hr) IV Continuous <Continuous>  dextrose 5%. 1000 milliLiter(s) (50 mL/Hr) IV Continuous <Continuous>  dextrose 50% Injectable 25 Gram(s) IV Push once  dextrose 50% Injectable 12.5 Gram(s) IV Push once  glucagon  Injectable 1 milliGRAM(s) IntraMuscular once  guaiFENesin  milliGRAM(s) Oral every 12 hours  heparin   Injectable 5000 Unit(s) SubCutaneous every 8 hours  insulin glargine Injectable (LANTUS) 10 Unit(s) SubCutaneous every morning  insulin lispro (ADMELOG) corrective regimen sliding scale   SubCutaneous three times a day before meals  levoFLOXacin IVPB 750 milliGRAM(s) IV Intermittent every 24 hours  lidocaine   4% Patch 1 Patch Transdermal every 24 hours  losartan 25 milliGRAM(s) Oral daily  metroNIDAZOLE  IVPB 500 milliGRAM(s) IV Intermittent every 8 hours  nicotine -   7 mG/24Hr(s) Patch 1 Patch Transdermal daily    MEDICATIONS  (PRN):  acetaminophen     Tablet .. 650 milliGRAM(s) Oral every 6 hours PRN Mild Pain (1 - 3)  cyclobenzaprine 10 milliGRAM(s) Oral three times a day PRN Muscle Spasm  dextrose Oral Gel 15 Gram(s) Oral once PRN Blood Glucose LESS THAN 70 milliGRAM(s)/deciliter  gabapentin 100 milliGRAM(s) Oral every 8 hours PRN nerve pain  HYDROmorphone  Injectable 1 milliGRAM(s) IV Push every 6 hours PRN Severe Pain (7 - 10)  ibuprofen  Tablet. 600 milliGRAM(s) Oral three times a day PRN Mild Pain (1 - 3)  LORazepam   Injectable 2 milliGRAM(s) IV Push every 1 hour PRN CIWA-Ar score 8 or greater  traMADol 50 milliGRAM(s) Oral two times a day PRN Moderate Pain (4 - 6)      Allergies    amoxicillin (Unknown)  Originally Entered as [Severe Rash] reaction to [adhesive tape] (Unknown)    Intolerances        SOCIAL HISTORY: Denies tobacco, etoh abuse or illicit drug use    FAMILY HISTORY:  No pertinent family history in first degree relatives        Vital Signs Last 24 Hrs  T(C): 37.2 (02 Jun 2024 15:52), Max: 37.8 (02 Jun 2024 01:01)  T(F): 98.9 (02 Jun 2024 15:52), Max: 100 (02 Jun 2024 01:01)  HR: 84 (02 Jun 2024 15:52) (83 - 95)  BP: 141/69 (02 Jun 2024 15:52) (141/69 - 176/68)  BP(mean): 92 (02 Jun 2024 06:27) (92 - 98)  RR: 18 (02 Jun 2024 15:52) (18 - 20)  SpO2: 97% (02 Jun 2024 15:52) (92% - 98%)    Parameters below as of 02 Jun 2024 15:52  Patient On (Oxygen Delivery Method): nasal cannula  O2 Flow (L/min): 3          REVIEW OF SYSTEMS:    CONSTITUTIONAL:  As per HPI.  HEENT:  Eyes:  No diplopia or blurred vision. ENT:  No earache, sore throat or runny nose.  CARDIOVASCULAR:  No pressure, squeezing, tightness, heaviness or aching about the chest, neck, axilla or epigastrium.  RESPIRATORY:  No cough, +shortness of breath, PND or orthopnea.  GASTROINTESTINAL:  No nausea, vomiting or diarrhea.  GENITOURINARY:  No dysuria, frequency or urgency.  MUSCULOSKELETAL:  As per HPI.  SKIN:  No change in skin, hair or nails.  NEUROLOGIC:  No paresthesias, fasciculations, seizures or weakness.  PSYCHIATRIC:  No disorder of thought or mood.  ENDOCRINE:  No heat or cold intolerance, polyuria or polydipsia.  HEMATOLOGICAL:  No easy bruising or bleedings:  .     PHYSICAL EXAMINATION:    GENERAL APPEARANCE:  Pt. is not currently dyspneic, in no distress. Pt. is alert, oriented, and pleasant.  HEENT:  Pupils are normal and react normally. No icterus. Mucous membranes well colored.  NECK:  Supple. No lymphadenopathy. Jugular venous pressure not elevated. Carotids equal.   HEART:   The cardiac impulse has a normal quality. Regular. Normal S1 and S2. There are no murmurs, rubs or gallops noted  CHEST:  Chest crackles to auscultation. Normal respiratory effort.  ABDOMEN:  Soft and nontender.   EXTREMITIES:  There is no cyanosis, clubbing or edema.   SKIN:  No rash or significant lesions are noted.    LABS:                        15.2   21.97 )-----------( 137      ( 02 Jun 2024 09:12 )             44.6     06-02    138  |  106  |  14  ----------------------------<  131<H>  3.7   |  23  |  0.74    Ca    8.8      02 Jun 2024 09:12  Phos  3.2     06-02  Mg     1.8     06-02    TPro  6.4  /  Alb  3.3  /  TBili  1.3<H>  /  DBili  x   /  AST  15  /  ALT  54  /  AlkPhos  91  06-02    LIVER FUNCTIONS - ( 02 Jun 2024 09:12 )  Alb: 3.3 g/dL / Pro: 6.4 gm/dL / ALK PHOS: 91 U/L / ALT: 54 U/L / AST: 15 U/L / GGT: x                 Urinalysis Basic - ( 02 Jun 2024 09:12 )    Color: x / Appearance: x / SG: x / pH: x  Gluc: 131 mg/dL / Ketone: x  / Bili: x / Urobili: x   Blood: x / Protein: x / Nitrite: x   Leuk Esterase: x / RBC: x / WBC x   Sq Epi: x / Non Sq Epi: x / Bacteria: x            RADIOLOGY & ADDITIONAL STUDIES:       CT Angio Chest PE Protocol w/ IV Cont (06.01.24 @ 20:00) >  IMPRESSION:  No pulmonary embolism.    Acute appearing mildly displaced left posterior sixth through ninth rib   fractures. No pneumothorax.    Peribronchial thickening with mucoid plugging of bilateral lower lobe   bronchi and lingular bronchi with asymmetric patchy airspace opacity in   the left lower lobe likely representing bronchopneumonia. Trace left   pleural effusion.

## 2024-06-02 NOTE — PROGRESS NOTE ADULT - NSPROGADDITIONALINFOA_GEN_ALL_CORE
< from: CT Abdomen and Pelvis No Cont (06.02.24 @ 16:31) >    IMPRESSION:  Bilateral lower lobe partial consolidation/collapse.  Enlarged prostate, correlate with PSA level.  1.7 cm left renal hyperdense lesion, could be a hyperdense cyst.  Multiple fracture deformity of the left posterior ribs again demonstrated.      --- End of Report ---      < end of copied text >

## 2024-06-02 NOTE — PHYSICAL THERAPY INITIAL EVALUATION ADULT - NSACTIVITYREC_GEN_A_PT
out of bed to chair uprght, pad L posterior rib cage with padded blanket/pillow to soften surface,amb with supervision using IV pole as needed

## 2024-06-02 NOTE — PROGRESS NOTE ADULT - ASSESSMENT
A/P:  Left 6-9th rib fractures s/p mechanical fall 5/30/24  Possible pneumonia    Plan:  c/w IV antibiotics  Incentive spirometry  Monitor pulse ox and HD status  Hospitalist consult   DVT prophylaxis  multimodal Pain control  PT  A/P:  Left 6-9th rib fractures s/p mechanical fall 5/30/24  Possible pneumonia  No new injuries on tertiary exam    Plan:  c/w IV antibiotics  Incentive spirometry  Monitor pulse ox and HD status  Hospitalist consult   DVT prophylaxis  multimodal Pain control  PT  A/P:  Left 6-9th rib fractures s/p mechanical fall 5/30/24  Possible pneumonia  No new injuries on tertiary exam    Plan:  c/w IV antibiotics  Incentive spirometry  Monitor pulse ox and HD status  Hospitalist consult   DVT prophylaxis  multimodal Pain control  PT     D/w Trauma team

## 2024-06-02 NOTE — PHYSICAL THERAPY INITIAL EVALUATION ADULT - PLANNED THERAPY INTERVENTIONS, PT EVAL
deep breathing/ coughing, splinting posterior rib cage/bed mobility training/gait training/transfer training

## 2024-06-02 NOTE — CONSULT NOTE ADULT - ASSESSMENT
PROBLEMS:    Left 6-9th rib fractures s/p mechanical fall 5/30/24  Possible pneumonia    PLAN:    IV LEVAQUIN  AEROSOLS  MUCOMYST/MUCINEX  Incentive spirometry  Monitor pulse ox and HD status  GI/DVT prophylaxis  Pain control  DVT PROPHYLASIX PROBLEMS:    Left 6-9th rib fractures s/p mechanical fall 5/30/24  Possible pneumonia    PLAN:    IV LEVAQUIN  AEROSOLS  MUCOMYST/MUCINEX/[place] valve  Incentive spirometry  Monitor pulse ox and HD status  GI/DVT prophylaxis  Pain control  DVT PROPHYLASIX

## 2024-06-02 NOTE — PHYSICAL THERAPY INITIAL EVALUATION ADULT - DIAGNOSIS, PT EVAL
mechanical fall 5/30 ,+multiple L sided rib fxs mechanical fall 5/30 ,+multiple acute mildly displaced L sided posterior rib fxs #6,7,8,9th , +Pneumonia

## 2024-06-02 NOTE — PROGRESS NOTE ADULT - ATTENDING COMMENTS
A/P:  Left 6-9th rib fractures s/p mechanical fall 5/30/24  Possible pneumonia  IV antibiotics  Incentive spirometry  Monitor pulse ox and HD status  Hospitalist on consult for med mgmt  GI/DVT prophylaxis  Pain control  F/U labs, radiologic studies  Cont current care and meds  Pulmonary consult pending  Pt aware of and agrees with all of the above    35 minuted of time spent on pt examination, review of relevant labs and radiologic studies, assured stabilization of pt, discussion with relevant services/providers for coordination of pt care and services

## 2024-06-02 NOTE — PROGRESS NOTE ADULT - SUBJECTIVE AND OBJECTIVE BOX
yesterday admitted for traumatic rib raptures, this am reports pain improved, using IS, no fever still coughing,        Vital Signs Last 24 Hrs  T(C): 37.8 (02 Jun 2024 01:01), Max: 37.8 (02 Jun 2024 01:01)  T(F): 100 (02 Jun 2024 01:01), Max: 100 (02 Jun 2024 01:01)  HR: 90 (02 Jun 2024 01:01) (83 - 104)  BP: 166/88 (02 Jun 2024 01:01) (147/77 - 194/94)  BP(mean): 98 (02 Jun 2024 00:07) (98 - 118)  RR: 20 (02 Jun 2024 01:01) (18 - 20)  SpO2: 92% (02 Jun 2024 01:01) (89% - 98%)    Parameters below as of 02 Jun 2024 01:01    O2 Flow (L/min): 3      Breathing is symmetric and unlabored  Neuro: CNII-XII grossly intact  Psych: normal affect  HEENT: Normocephalic, atraumatic, VANESSA, EOM wnl,  Neck: Soft and supple, nontender to passive/active ROM exam. No crepitus, no ecchymosis, no hematoma, to exam, no JVD, no tracheal deviation  Cspine/thoracolumbrosacral spine: no gross bony pathology or tenderness to exam  Cardiovascular: S1S2 Present  Chest: no gross right rib pathology or tenderness to exam. + tenderness to left 6-9th rib region from known fracture pathology. No sternal pathology or tenderness to exam. No crepitus, no ecchymosis, no hematoma. No penetrating thorcoabdominal trauma  Respiratory: Rate is 18; Respiratory Effort normal; no wheezes, rales or rhonchi to exam  ABD: bowel sounds (+), soft, nontender, non distended, no rebound, no guarding, no rigidity, no skin changes to exam. No pelvic instability to exam, no skin changes  Musculoskeletal: Pt has palpable b/l radial, femoral, dorsalis pedis pulses. All digits are warm and well perfused. No gross long bone pathology or tenderness to exam. Pt demonstrates grossly intact sensoromotor function. Pt has good capillary refill to digits, no calf edema or tenderness to exam.  Skin: no lesions or rashes to exam      I&O's Summary    01 Jun 2024 07:01  -  02 Jun 2024 05:07  --------------------------------------------------------  IN: 0 mL / OUT: 600 mL / NET: -600 mL      I&O's Detail    01 Jun 2024 07:01  -  02 Jun 2024 05:07  --------------------------------------------------------  IN:  Total IN: 0 mL    OUT:    Voided (mL): 600 mL  Total OUT: 600 mL    Total NET: -600 mL          MEDICATIONS  (STANDING):  atorvastatin 80 milliGRAM(s) Oral at bedtime  dextrose 10% Bolus 125 milliLiter(s) IV Bolus once  dextrose 5%. 1000 milliLiter(s) (100 mL/Hr) IV Continuous <Continuous>  dextrose 5%. 1000 milliLiter(s) (50 mL/Hr) IV Continuous <Continuous>  dextrose 50% Injectable 25 Gram(s) IV Push once  dextrose 50% Injectable 12.5 Gram(s) IV Push once  glucagon  Injectable 1 milliGRAM(s) IntraMuscular once  heparin   Injectable 5000 Unit(s) SubCutaneous every 8 hours  insulin glargine Injectable (LANTUS) 10 Unit(s) SubCutaneous every morning  insulin lispro (ADMELOG) corrective regimen sliding scale   SubCutaneous three times a day before meals  levoFLOXacin IVPB 750 milliGRAM(s) IV Intermittent every 24 hours  lidocaine   4% Patch 1 Patch Transdermal every 24 hours  losartan 25 milliGRAM(s) Oral daily  nicotine -   7 mG/24Hr(s) Patch 1 Patch Transdermal daily    MEDICATIONS  (PRN):  acetaminophen     Tablet .. 650 milliGRAM(s) Oral every 6 hours PRN Mild Pain (1 - 3)  albuterol   0.5% 2.5 milliGRAM(s) Nebulizer every 6 hours PRN Shortness of Breath and/or Wheezing  cyclobenzaprine 5 milliGRAM(s) Oral three times a day PRN Muscle Spasm  dextrose Oral Gel 15 Gram(s) Oral once PRN Blood Glucose LESS THAN 70 milliGRAM(s)/deciliter  gabapentin 100 milliGRAM(s) Oral every 8 hours PRN nerve pain  HYDROmorphone  Injectable 0.5 milliGRAM(s) IV Push every 6 hours PRN Severe Pain (7 - 10)  ibuprofen  Tablet. 600 milliGRAM(s) Oral every 8 hours PRN Moderate Pain (4 - 6)  LORazepam   Injectable 2 milliGRAM(s) IV Push every 1 hour PRN CIWA-Ar score 8 or greater      LABS:                        15.8   18.97 )-----------( 153      ( 01 Jun 2024 19:23 )             46.6     06-01    139  |  109<H>  |  20  ----------------------------<  142<H>  4.0   |  20<L>  |  0.80    Ca    9.3      01 Jun 2024 19:23  Mg     2.0     06-01    TPro  7.3  /  Alb  4.1  /  TBili  1.0  /  DBili  x   /  AST  25  /  ALT  69  /  AlkPhos  106  06-01      Urinalysis Basic - ( 02 Jun 2024 00:03 )    Color: Yellow / Appearance: Clear / SG: >1.030 / pH: x  Gluc: x / Ketone: 40 mg/dL  / Bili: Negative / Urobili: 0.2 mg/dL   Blood: x / Protein: Negative mg/dL / Nitrite: Negative   Leuk Esterase: Negative / RBC: x / WBC x   Sq Epi: x / Non Sq Epi: x / Bacteria: x        RADIOLOGY & ADDITIONAL STUDIES:  No pulmonary embolism.    Acute appearing mildly displaced left posterior sixth through ninth rib   fractures. No pneumothorax.    Peribronchial thickening with mucoid plugging of bilateral lower lobe   bronchi and lingular bronchi with asymmetric patchy airspace opacity in   the left lower lobe likely representing bronchopneumonia. Trace left   pleural effusion. CC: Patient is a 59y old  Male who presents with a chief complaint of multiple rib fractures (02 Jun 2024 15:10)    yesterday admitted for traumatic rib raptures, this am reports pain improved, using IS,   Pt seen and examined at bedside with chaperone. Pt is AAOx3, pt in no acute distress. Pt denied c/o fever, chills, SOB, abd pain, N/V/D, extremity pain or dysfunction, hemoptysis, hematemesis, hematuria, hematochexia, headache, diplopia, vertigo, dizzyness. Pt tolerating diet, (+) void, (+) incentive spirometry, (+) bowel function    ROS: as above otherwise negative    Vital Signs Last 24 Hrs  T(C): 37.8 (02 Jun 2024 01:01), Max: 37.8 (02 Jun 2024 01:01)  T(F): 100 (02 Jun 2024 01:01), Max: 100 (02 Jun 2024 01:01)  HR: 90 (02 Jun 2024 01:01) (83 - 104)  BP: 166/88 (02 Jun 2024 01:01) (147/77 - 194/94)  BP(mean): 98 (02 Jun 2024 00:07) (98 - 118)  RR: 20 (02 Jun 2024 01:01) (18 - 20)  SpO2: 92% (02 Jun 2024 01:01) (89% - 98%)    Parameters below as of 02 Jun 2024 01:01    O2 Flow (L/min): 3      Breathing is symmetric and unlabored  Neuro: CNII-XII grossly intact  Psych: normal affect  HEENT: Normocephalic, atraumatic, VANESSA, EOM wnl,  Neck: Soft and supple, nontender to passive/active ROM exam. No crepitus, no ecchymosis, no hematoma, to exam, no JVD, no tracheal deviation  Cspine/thoracolumbrosacral spine: no gross bony pathology or tenderness to exam  Cardiovascular: S1S2 Present  Chest: no gross right rib pathology or tenderness to exam. + tenderness to left 6-9th rib region from known fracture pathology. No sternal pathology or tenderness to exam. No crepitus, no ecchymosis, no hematoma. No penetrating thorcoabdominal trauma  Respiratory: Rate is 18; Respiratory Effort normal; no wheezes, rales or rhonchi to exam  ABD: bowel sounds (+), soft, nontender, non distended, no rebound, no guarding, no rigidity, no skin changes to exam. No pelvic instability to exam, no skin changes  Musculoskeletal: Pt has palpable b/l radial, femoral, dorsalis pedis pulses. All digits are warm and well perfused. No gross long bone pathology or tenderness to exam. Pt demonstrates grossly intact sensoromotor function. Pt has good capillary refill to digits, no calf edema or tenderness to exam.  Skin: no lesions or rashes to exam      I&O's Summary    01 Jun 2024 07:01  -  02 Jun 2024 05:07  --------------------------------------------------------  IN: 0 mL / OUT: 600 mL / NET: -600 mL      I&O's Detail    01 Jun 2024 07:01  -  02 Jun 2024 05:07  --------------------------------------------------------  IN:  Total IN: 0 mL    OUT:    Voided (mL): 600 mL  Total OUT: 600 mL    Total NET: -600 mL          MEDICATIONS  (STANDING):  atorvastatin 80 milliGRAM(s) Oral at bedtime  dextrose 10% Bolus 125 milliLiter(s) IV Bolus once  dextrose 5%. 1000 milliLiter(s) (100 mL/Hr) IV Continuous <Continuous>  dextrose 5%. 1000 milliLiter(s) (50 mL/Hr) IV Continuous <Continuous>  dextrose 50% Injectable 25 Gram(s) IV Push once  dextrose 50% Injectable 12.5 Gram(s) IV Push once  glucagon  Injectable 1 milliGRAM(s) IntraMuscular once  heparin   Injectable 5000 Unit(s) SubCutaneous every 8 hours  insulin glargine Injectable (LANTUS) 10 Unit(s) SubCutaneous every morning  insulin lispro (ADMELOG) corrective regimen sliding scale   SubCutaneous three times a day before meals  levoFLOXacin IVPB 750 milliGRAM(s) IV Intermittent every 24 hours  lidocaine   4% Patch 1 Patch Transdermal every 24 hours  losartan 25 milliGRAM(s) Oral daily  nicotine -   7 mG/24Hr(s) Patch 1 Patch Transdermal daily    MEDICATIONS  (PRN):  acetaminophen     Tablet .. 650 milliGRAM(s) Oral every 6 hours PRN Mild Pain (1 - 3)  albuterol   0.5% 2.5 milliGRAM(s) Nebulizer every 6 hours PRN Shortness of Breath and/or Wheezing  cyclobenzaprine 5 milliGRAM(s) Oral three times a day PRN Muscle Spasm  dextrose Oral Gel 15 Gram(s) Oral once PRN Blood Glucose LESS THAN 70 milliGRAM(s)/deciliter  gabapentin 100 milliGRAM(s) Oral every 8 hours PRN nerve pain  HYDROmorphone  Injectable 0.5 milliGRAM(s) IV Push every 6 hours PRN Severe Pain (7 - 10)  ibuprofen  Tablet. 600 milliGRAM(s) Oral every 8 hours PRN Moderate Pain (4 - 6)  LORazepam   Injectable 2 milliGRAM(s) IV Push every 1 hour PRN CIWA-Ar score 8 or greater      LABS:                        15.8   18.97 )-----------( 153      ( 01 Jun 2024 19:23 )             46.6     06-01    139  |  109<H>  |  20  ----------------------------<  142<H>  4.0   |  20<L>  |  0.80    Ca    9.3      01 Jun 2024 19:23  Mg     2.0     06-01    TPro  7.3  /  Alb  4.1  /  TBili  1.0  /  DBili  x   /  AST  25  /  ALT  69  /  AlkPhos  106  06-01      Urinalysis Basic - ( 02 Jun 2024 00:03 )    Color: Yellow / Appearance: Clear / SG: >1.030 / pH: x  Gluc: x / Ketone: 40 mg/dL  / Bili: Negative / Urobili: 0.2 mg/dL   Blood: x / Protein: Negative mg/dL / Nitrite: Negative   Leuk Esterase: Negative / RBC: x / WBC x   Sq Epi: x / Non Sq Epi: x / Bacteria: x        RADIOLOGY & ADDITIONAL STUDIES:  No pulmonary embolism.    Acute appearing mildly displaced left posterior sixth through ninth rib   fractures. No pneumothorax.    Peribronchial thickening with mucoid plugging of bilateral lower lobe   bronchi and lingular bronchi with asymmetric patchy airspace opacity in   the left lower lobe likely representing bronchopneumonia. Trace left   pleural effusion. Trauma Progress Note with Tertiary Exam    CC: Patient is a 59y old  Male who presents with a chief complaint of multiple rib fractures (02 Jun 2024 15:10)    yesterday admitted for traumatic rib fractures, this am reports pain improved, using IS,   Pt seen and examined at bedside with chaperone. Pt is AAOx3, pt in no acute distress. Pt denied c/o fever, chills, SOB, abd pain, N/V/D, extremity pain or dysfunction, hemoptysis, hematemesis, hematuria, hematochexia, headache, diplopia, vertigo, dizziness. Pt tolerating diet, (+) void, (+) incentive spirometry, (+) bowel function    ROS: as above otherwise negative    Vital Signs Last 24 Hrs  T(C): 37.8 (02 Jun 2024 01:01), Max: 37.8 (02 Jun 2024 01:01)  T(F): 100 (02 Jun 2024 01:01), Max: 100 (02 Jun 2024 01:01)  HR: 90 (02 Jun 2024 01:01) (83 - 104)  BP: 166/88 (02 Jun 2024 01:01) (147/77 - 194/94)  BP(mean): 98 (02 Jun 2024 00:07) (98 - 118)  RR: 20 (02 Jun 2024 01:01) (18 - 20)  SpO2: 92% (02 Jun 2024 01:01) (89% - 98%)    Parameters below as of 02 Jun 2024 01:01    O2 Flow (L/min): 3      Breathing is symmetric and unlabored  Neuro: CNII-XII grossly intact  Psych: normal affect  HEENT: Normocephalic, atraumatic, VANESSA, EOM wnl,  Neck: Soft and supple, nontender to passive/active ROM exam. No crepitus, no ecchymosis, no hematoma, to exam, no JVD, no tracheal deviation  Cspine/thoracolumbrosacral spine: no gross bony pathology or tenderness to exam  Cardiovascular: S1S2 Present  Chest: no gross right rib pathology or tenderness to exam. + tenderness to left 6-9th rib region from known fracture pathology. No sternal pathology or tenderness to exam. No crepitus, no ecchymosis, no hematoma. No penetrating thorcoabdominal trauma  Respiratory: Rate is 18; Respiratory Effort normal; no wheezes, rales or rhonchi to exam  ABD: bowel sounds (+), soft, nontender, non distended, no rebound, no guarding, no rigidity, no skin changes to exam. No pelvic instability to exam, no skin changes  Musculoskeletal: Pt has palpable b/l radial, femoral, dorsalis pedis pulses. All digits are warm and well perfused. No gross long bone pathology or tenderness to exam. Pt demonstrates grossly intact sensoromotor function. Pt has good capillary refill to digits, no calf edema or tenderness to exam.  Skin: no lesions or rashes to exam      I&O's Summary    01 Jun 2024 07:01  -  02 Jun 2024 05:07  --------------------------------------------------------  IN: 0 mL / OUT: 600 mL / NET: -600 mL      I&O's Detail    01 Jun 2024 07:01  -  02 Jun 2024 05:07  --------------------------------------------------------  IN:  Total IN: 0 mL    OUT:    Voided (mL): 600 mL  Total OUT: 600 mL    Total NET: -600 mL          MEDICATIONS  (STANDING):  atorvastatin 80 milliGRAM(s) Oral at bedtime  dextrose 10% Bolus 125 milliLiter(s) IV Bolus once  dextrose 5%. 1000 milliLiter(s) (100 mL/Hr) IV Continuous <Continuous>  dextrose 5%. 1000 milliLiter(s) (50 mL/Hr) IV Continuous <Continuous>  dextrose 50% Injectable 25 Gram(s) IV Push once  dextrose 50% Injectable 12.5 Gram(s) IV Push once  glucagon  Injectable 1 milliGRAM(s) IntraMuscular once  heparin   Injectable 5000 Unit(s) SubCutaneous every 8 hours  insulin glargine Injectable (LANTUS) 10 Unit(s) SubCutaneous every morning  insulin lispro (ADMELOG) corrective regimen sliding scale   SubCutaneous three times a day before meals  levoFLOXacin IVPB 750 milliGRAM(s) IV Intermittent every 24 hours  lidocaine   4% Patch 1 Patch Transdermal every 24 hours  losartan 25 milliGRAM(s) Oral daily  nicotine -   7 mG/24Hr(s) Patch 1 Patch Transdermal daily    MEDICATIONS  (PRN):  acetaminophen     Tablet .. 650 milliGRAM(s) Oral every 6 hours PRN Mild Pain (1 - 3)  albuterol   0.5% 2.5 milliGRAM(s) Nebulizer every 6 hours PRN Shortness of Breath and/or Wheezing  cyclobenzaprine 5 milliGRAM(s) Oral three times a day PRN Muscle Spasm  dextrose Oral Gel 15 Gram(s) Oral once PRN Blood Glucose LESS THAN 70 milliGRAM(s)/deciliter  gabapentin 100 milliGRAM(s) Oral every 8 hours PRN nerve pain  HYDROmorphone  Injectable 0.5 milliGRAM(s) IV Push every 6 hours PRN Severe Pain (7 - 10)  ibuprofen  Tablet. 600 milliGRAM(s) Oral every 8 hours PRN Moderate Pain (4 - 6)  LORazepam   Injectable 2 milliGRAM(s) IV Push every 1 hour PRN CIWA-Ar score 8 or greater      LABS:                        15.8   18.97 )-----------( 153      ( 01 Jun 2024 19:23 )             46.6     06-01    139  |  109<H>  |  20  ----------------------------<  142<H>  4.0   |  20<L>  |  0.80    Ca    9.3      01 Jun 2024 19:23  Mg     2.0     06-01    TPro  7.3  /  Alb  4.1  /  TBili  1.0  /  DBili  x   /  AST  25  /  ALT  69  /  AlkPhos  106  06-01      Urinalysis Basic - ( 02 Jun 2024 00:03 )    Color: Yellow / Appearance: Clear / SG: >1.030 / pH: x  Gluc: x / Ketone: 40 mg/dL  / Bili: Negative / Urobili: 0.2 mg/dL   Blood: x / Protein: Negative mg/dL / Nitrite: Negative   Leuk Esterase: Negative / RBC: x / WBC x   Sq Epi: x / Non Sq Epi: x / Bacteria: x        RADIOLOGY & ADDITIONAL STUDIES:  No pulmonary embolism.    Acute appearing mildly displaced left posterior sixth through ninth rib   fractures. No pneumothorax.    Peribronchial thickening with mucoid plugging of bilateral lower lobe   bronchi and lingular bronchi with asymmetric patchy airspace opacity in   the left lower lobe likely representing bronchopneumonia. Trace left   pleural effusion. Trauma Progress Note with Tertiary Exam    CC: Patient is a 59y old  Male who presents with a chief complaint of multiple rib fractures (02 Jun 2024 15:10)    yesterday admitted for traumatic rib fractures, this am reports pain improved, using IS,   Pt seen and examined at bedside with chaperone. Pt is AAOx3, pt in no acute distress. Pt denied c/o fever, chills, SOB, abd pain, N/V/D, extremity pain or dysfunction, hemoptysis, hematemesis, hematuria, hematochexia, headache, diplopia, vertigo, dizziness. Pt tolerating diet, (+) void, (+) incentive spirometry, (+) bowel function    ROS: as above otherwise negative    Vital Signs Last 24 Hrs  T(C): 37.8 (02 Jun 2024 01:01), Max: 37.8 (02 Jun 2024 01:01)  T(F): 100 (02 Jun 2024 01:01), Max: 100 (02 Jun 2024 01:01)  HR: 90 (02 Jun 2024 01:01) (83 - 104)  BP: 166/88 (02 Jun 2024 01:01) (147/77 - 194/94)  BP(mean): 98 (02 Jun 2024 00:07) (98 - 118)  RR: 20 (02 Jun 2024 01:01) (18 - 20)  SpO2: 92% (02 Jun 2024 01:01) (89% - 98%)    Parameters below as of 02 Jun 2024 01:01    O2 Flow (L/min): 3      Breathing is symmetric and unlabored  Neuro: CNII-XII grossly intact  Psych: normal affect  HEENT: Normocephalic, atraumatic, VANESSA, EOM wnl,  Neck: Soft and supple, nontender to passive/active ROM exam. No crepitus, no ecchymosis, no hematoma, to exam, no JVD, no tracheal deviation  Cspine/thoracolumbrosacral spine: no gross bony pathology or tenderness to exam  Cardiovascular: S1S2 Present  Chest: no gross right rib pathology or tenderness to exam. + tenderness to left 6-9th rib region from known fracture pathology. No sternal pathology or tenderness to exam. No crepitus, no ecchymosis, no hematoma. No penetrating thorcoabdominal trauma  Respiratory: Rate is 18; Respiratory Effort normal; no wheezes, rales or rhonchi to exam  ABD: bowel sounds (+), soft, nontender, non distended, no rebound, no guarding, no rigidity, no skin changes to exam. No pelvic instability to exam, no skin changes  Musculoskeletal: Pt has palpable b/l radial, femoral, dorsalis pedis pulses. All digits are warm and well perfused. No gross long bone pathology or tenderness to exam. Pt demonstrates grossly intact sensoromotor function. Pt has good capillary refill to digits, no calf edema or tenderness to exam.  Skin: no lesions or rashes to exam      I&O's Summary    01 Jun 2024 07:01  -  02 Jun 2024 05:07  --------------------------------------------------------  IN: 0 mL / OUT: 600 mL / NET: -600 mL      I&O's Detail    01 Jun 2024 07:01  -  02 Jun 2024 05:07  --------------------------------------------------------  IN:  Total IN: 0 mL    OUT:    Voided (mL): 600 mL  Total OUT: 600 mL    Total NET: -600 mL          MEDICATIONS  (STANDING):  atorvastatin 80 milliGRAM(s) Oral at bedtime  dextrose 10% Bolus 125 milliLiter(s) IV Bolus once  dextrose 5%. 1000 milliLiter(s) (100 mL/Hr) IV Continuous <Continuous>  dextrose 5%. 1000 milliLiter(s) (50 mL/Hr) IV Continuous <Continuous>  dextrose 50% Injectable 25 Gram(s) IV Push once  dextrose 50% Injectable 12.5 Gram(s) IV Push once  glucagon  Injectable 1 milliGRAM(s) IntraMuscular once  heparin   Injectable 5000 Unit(s) SubCutaneous every 8 hours  insulin glargine Injectable (LANTUS) 10 Unit(s) SubCutaneous every morning  insulin lispro (ADMELOG) corrective regimen sliding scale   SubCutaneous three times a day before meals  levoFLOXacin IVPB 750 milliGRAM(s) IV Intermittent every 24 hours  lidocaine   4% Patch 1 Patch Transdermal every 24 hours  losartan 25 milliGRAM(s) Oral daily  nicotine -   7 mG/24Hr(s) Patch 1 Patch Transdermal daily    MEDICATIONS  (PRN):  acetaminophen     Tablet .. 650 milliGRAM(s) Oral every 6 hours PRN Mild Pain (1 - 3)  albuterol   0.5% 2.5 milliGRAM(s) Nebulizer every 6 hours PRN Shortness of Breath and/or Wheezing  cyclobenzaprine 5 milliGRAM(s) Oral three times a day PRN Muscle Spasm  dextrose Oral Gel 15 Gram(s) Oral once PRN Blood Glucose LESS THAN 70 milliGRAM(s)/deciliter  gabapentin 100 milliGRAM(s) Oral every 8 hours PRN nerve pain  HYDROmorphone  Injectable 0.5 milliGRAM(s) IV Push every 6 hours PRN Severe Pain (7 - 10)  ibuprofen  Tablet. 600 milliGRAM(s) Oral every 8 hours PRN Moderate Pain (4 - 6)  LORazepam   Injectable 2 milliGRAM(s) IV Push every 1 hour PRN CIWA-Ar score 8 or greater      LABS:                        15.8   18.97 )-----------( 153      ( 01 Jun 2024 19:23 )             46.6     06-01    139  |  109<H>  |  20  ----------------------------<  142<H>  4.0   |  20<L>  |  0.80    Ca    9.3      01 Jun 2024 19:23  Mg     2.0     06-01    TPro  7.3  /  Alb  4.1  /  TBili  1.0  /  DBili  x   /  AST  25  /  ALT  69  /  AlkPhos  106  06-01      Urinalysis Basic - ( 02 Jun 2024 00:03 )    Color: Yellow / Appearance: Clear / SG: >1.030 / pH: x  Gluc: x / Ketone: 40 mg/dL  / Bili: Negative / Urobili: 0.2 mg/dL   Blood: x / Protein: Negative mg/dL / Nitrite: Negative   Leuk Esterase: Negative / RBC: x / WBC x   Sq Epi: x / Non Sq Epi: x / Bacteria: x        RADIOLOGY & ADDITIONAL STUDIES:    6/1 CXR  IMPRESSION: Normal heart and mediastinum. Lungs reveal patchy left lower   lobe/retrocardiac infiltrate/atelectasis. Angles sharp. Bones without   acute abnormality.    Please refer to CT angiography of the same day for additional information      CT PE protocol    No pulmonary embolism.    Acute appearing mildly displaced left posterior sixth through ninth rib   fractures. No pneumothorax.    Peribronchial thickening with mucoid plugging of bilateral lower lobe   bronchi and lingular bronchi with asymmetric patchy airspace opacity in   the left lower lobe likely representing bronchopneumonia. Trace left   pleural effusion.    CT A/P  IMPRESSION:  Bilateral lower lobe partial consolidation/collapse.  Enlarged prostate, correlate with PSA level.  1.7 cm left renal hyperdense lesion, could be a hyperdense cyst.  Multiple fracture deformity of the left posterior ribs again demonstrated.

## 2024-06-02 NOTE — PHYSICAL THERAPY INITIAL EVALUATION ADULT - ACTIVE RANGE OF MOTION EXAMINATION, REHAB EVAL
L shoulder not tested above horizon due to 4 acute mildly displaced L posterior rib fxs/Right UE Active ROM was WNL (within normal limits)/bilateral lower extremity Active ROM was WNL (within normal limits)/Left UE Active ROM was WFL (within functional limits)

## 2024-06-02 NOTE — PATIENT PROFILE ADULT - FALL HARM RISK - HARM RISK INTERVENTIONS

## 2024-06-02 NOTE — PHYSICAL THERAPY INITIAL EVALUATION ADULT - PERTINENT HX OF CURRENT PROBLEM, REHAB EVAL
59 yr old male presents to ED s/p mechanical fall while on vacation in [facility] on 5/30/24. [place] to [place] 6/1/24 and presented to [place] ED for evaluation of left rib/chest pain post fall. Pt denied headstrike or LOC. Pt denied any other complaints.   Pt seen and examined at bedside with chaperone. Pt is AAOx3, pt in no acute distress. Pt denied c/o fever, chills, abd pain, N/V/D, extremity pain or dysfunction, hemoptysis, hematemesis, hematuria, hematochexia, headache, diplopia, vertigo, dizzyness. Pt tolerating diet, (+) void, (+) ambulation, (+) bowel function 59 yr old male presents to ED s/p mechanical fall while on vacation in [facility] on 5/30/24. Pt returned to [place] 6/1/24 and presented to [place] ED for evaluation of left rib/chest pain post fall. Pt denied headstrike or LOC. Pt denied any other complaints.   Pt seen and examined at bedside with chaperone. Pt is AAOx3, pt in no acute distress. Pt denied c/o fever, chills, abd pain, N/V/D, extremity pain or dysfunction, hemoptysis, hematemesis, hematuria, hematochezia, headache, diplopia, vertigo, dizziness. Pt tolerating diet, (+) void, (+) ambulation, (+) bowel function 59 yr old male presents to ED s/p mechanical slip/fall on wet tile [place] [facility] [place]. Pt returned to [place] 6/1/24 and presented to [place] ED for evaluation of left rib/chest pain post fall. Pt denied headstrike or LOC. Pt denied any other complaints.   Pt seen and examined at bedside with chaperone. Pt is AAOx3, pt in no acute distress. Pt denied c/o fever, chills, abd pain, N/V/D, extremity pain or dysfunction, hemoptysis, hematemesis, hematuria, hematochezia, headache, diplopia, vertigo, dizziness. Pt tolerating diet, (+) void, (+) ambulation, (+) bowel function

## 2024-06-02 NOTE — CONSULT NOTE ADULT - SUBJECTIVE AND OBJECTIVE BOX
PCP: Dr Jai Scherer     CHIEF COMPLAINT: S/p mechanical fall, rib fx, pna    HISTORY OF THE PRESENT ILLNESS:  58 y/o male with PMHx of HTN, HLD, DM presented to the ED s/p mechanical fall while on vacation in [facility] on 5/30/24.  Pt states he slipped on wet surface near pool no head trauma and landed on left sided. Pt returned to  6/1/24 and presented to [place] ED for evaluation of left rib/chest pain post fall.  Worse with inspiration. Pt denied headstrike or LOC. Pt denied any other complaints. Pt denied c/o fever, chills, abd pain, N/V/D, extremity pain or dysfunction, hemoptysis, hematemesis, hematuria, hematochexia, headache, diplopia, vertigo, dizzyness. Patient admitted to the trauma service hospitalist team consulted for pna and medical management         PAST MEDICAL HISTORY: Hypertension, Hlid    PAST SURGICAL HISTORY: History of bowel resection     Shoulder pain with history of repair of rotator cuff.     FAMILY HISTORY:   non-contributory to the patient's current presentation    SOCIAL HISTORY: Reports cigar use, social alcohol use and marijuana use.     REVIEW OF SYSTEMS:   All 10 systems reviewed in detailed and found to be negative with the exception of what has already been described above    MEDICATIONS  (STANDING):  atorvastatin 80 milliGRAM(s) Oral at bedtime  dextrose 10% Bolus 125 milliLiter(s) IV Bolus once  dextrose 5%. 1000 milliLiter(s) (100 mL/Hr) IV Continuous <Continuous>  dextrose 5%. 1000 milliLiter(s) (50 mL/Hr) IV Continuous <Continuous>  dextrose 50% Injectable 25 Gram(s) IV Push once  dextrose 50% Injectable 12.5 Gram(s) IV Push once  glucagon  Injectable 1 milliGRAM(s) IntraMuscular once  heparin   Injectable 5000 Unit(s) SubCutaneous every 8 hours  insulin glargine Injectable (LANTUS) 10 Unit(s) SubCutaneous every morning  insulin lispro (ADMELOG) corrective regimen sliding scale   SubCutaneous three times a day before meals  levoFLOXacin IVPB 750 milliGRAM(s) IV Intermittent every 24 hours  lidocaine   4% Patch 1 Patch Transdermal every 24 hours  losartan 25 milliGRAM(s) Oral daily  metroNIDAZOLE  IVPB 500 milliGRAM(s) IV Intermittent every 8 hours  metroNIDAZOLE  IVPB      nicotine -   7 mG/24Hr(s) Patch 1 Patch Transdermal daily    MEDICATIONS  (PRN):  acetaminophen     Tablet .. 650 milliGRAM(s) Oral every 6 hours PRN Mild Pain (1 - 3)  albuterol    0.083% 2.5 milliGRAM(s) Nebulizer every 6 hours PRN Shortness of Breath and/or Wheezing  cyclobenzaprine 10 milliGRAM(s) Oral three times a day PRN Muscle Spasm  dextrose Oral Gel 15 Gram(s) Oral once PRN Blood Glucose LESS THAN 70 milliGRAM(s)/deciliter  gabapentin 100 milliGRAM(s) Oral every 8 hours PRN nerve pain  HYDROmorphone  Injectable 1 milliGRAM(s) IV Push every 6 hours PRN Severe Pain (7 - 10)  ibuprofen  Tablet. 600 milliGRAM(s) Oral three times a day PRN Mild Pain (1 - 3)  LORazepam   Injectable 2 milliGRAM(s) IV Push every 1 hour PRN CIWA-Ar score 8 or greater  traMADol 50 milliGRAM(s) Oral two times a day PRN Moderate Pain (4 - 6)      HEENT:  pupils equal and reactive, EOMI, no oropharyngeal lesions, erythema, exudates, oral thrush  NECK:   supple, no carotid bruits  CV:  +S1, +S2, regular, no murmurs  RESP:   lungs clear to auscultation bilaterally   Chest + tenderness to left 6-9th rib region from known fracture pathology. No sternal pathology or tenderness to exam. No crepitus, no ecchymosis, no hematoma.   GI:  abdomen soft, non-tender, non-distended, normal BS  EXT:  no clubbing, no cyanosis, no edema, no calf pain, swelling or erythema  NEURO:  AAOX3, no focal neurological deficits, follows all commands, able to move extremities spontaneously  SKIN:  no ulcers, lesions or rashes    LABS:                          15.2   21.97 )-----------( 137      ( 02 Jun 2024 09:12 )             44.6     06-02    138  |  106  |  14  ----------------------------<  131<H>  3.7   |  23  |  0.74    Ca    8.8      02 Jun 2024 09:12  Phos  3.2     06-02  Mg     1.8     06-02    TPro  6.4  /  Alb  3.3  /  TBili  1.3<H>  /  DBili  x   /  AST  15  /  ALT  54  /  AlkPhos  91  06-02        LIVER FUNCTIONS - ( 02 Jun 2024 09:12 )  Alb: 3.3 g/dL / Pro: 6.4 gm/dL / ALK PHOS: 91 U/L / ALT: 54 U/L / AST: 15 U/L / GGT: x             Urinalysis Basic - ( 02 Jun 2024 09:12 )    Color: x / Appearance: x / SG: x / pH: x  Gluc: 131 mg/dL / Ketone: x  / Bili: x / Urobili: x   Blood: x / Protein: x / Nitrite: x   Leuk Esterase: x / RBC: x / WBC x   Sq Epi: x / Non Sq Epi: x / Bacteria: x        Lactate, Blood: 0.9 mmol/L (06-01 @ 21:22)    Blood, Urine: Negative (06-02 @ 00:03)    Troponin Trend: Lactate, Blood: 0.9 mmol/L (06-01 @ 21:22)    Lactate, Blood: 0.9 mmol/L (06-01 @ 21:22)              RADIOLOGY STUDIES:     CXR   IMPRESSION: Normal heart and mediastinum. Lungs reveal patchy left lower   lobe/retrocardiac infiltrate/atelectasis. Angles sharp. Bones without   acute abnormality.     CTA CHEST   -No pulmonary embolism.  Acute appearing mildly displaced left posterior sixth through ninth rib   fractures. No pneumothorax.  Peribronchial thickening with mucoid plugging of bilateral lower lobe   bronchi and lingular bronchi with asymmetric patchy airspace opacity in   the left lower lobe likely representing bronchopneumonia. Trace left   pleural effusion.    A/P      58 y/o male with PMHx of HTN, HLD, DM presents to the ED c/o left sided rib pain and difficulty breathing s/p fall in [facility] on Thursday. Pt states he slipped on wet surface near pool no head trauma and landed on left sided. Pt states pain gradually worse and worse with deep breath. Pt has been ambulating since then. Pt found to be hypoxic in ED., no leg pain, no chest pain, no numbness/tingling/weakness of extremities, no abdominal pain, no N/V/D      Left 6-9th rib fractures s/p mechanical fall 5/30/24  Trauma team primary  Multimodal pain control   severe Dilaudid to 1mg q6 - wean as tolerated  moderate - tramadol 50mg BID  Mild - Tylenol/ibuprofen  Increased flexeril to 10mg TID for spasm  Continue gabapentin 100 q8   Continue lidocaine patch   Continue pulmonary toileting     Community Acquired Pneumonia secondary to Rib fracture   Continue O2 therapy, wean o2 as tolerated   Currently on 3L O2 via nasal cannula SPO2 97%  Continue Levaquin and metronidazole at this time   Incentive spirometry  Continue Duoneb nebs  Obtain Viral panel and sputum culture, Blood cultures   Add mucinex and tessalon perles  Abdomen soft nondistended non tender  CT abdomen to r/o abdominal pathology  ID consulted recommendations appreciated  Patient with amoxicillin allergy  If no intraabdominal source, dc metronidazole.       Hypertension/Hyperlipidemia  Continue home losartan  Continue home statin     History of nicotine dependence  Continue Nicotine patch    Diabetes   Hold home meds  Monitor AC&HS   Place on insulin sliding Scale,     DVT ppx Heparin Subq     Thank You for the Consult Will continue to follow

## 2024-06-02 NOTE — PHYSICAL THERAPY INITIAL EVALUATION ADULT - LIVES WITH, PROFILE
resides with wife Christina in [place]/spouse resides with wife Christina in [place], his 81 yr old mother recently moved into home/spouse

## 2024-06-02 NOTE — PHYSICAL THERAPY INITIAL EVALUATION ADULT - GENERAL OBSERVATIONS, REHAB EVAL
Semi-reclined in bed on supp O2 2L/min, IV LUE , awake,alert, Ox4,pleasant & cooperative ,intermittent congsetsed cough with L post rib cage pain

## 2024-06-03 LAB
GLUCOSE BLDC GLUCOMTR-MCNC: 152 MG/DL — HIGH (ref 70–99)
GLUCOSE BLDC GLUCOMTR-MCNC: 155 MG/DL — HIGH (ref 70–99)
GLUCOSE BLDC GLUCOMTR-MCNC: 176 MG/DL — HIGH (ref 70–99)
GLUCOSE BLDC GLUCOMTR-MCNC: 198 MG/DL — HIGH (ref 70–99)

## 2024-06-03 PROCEDURE — 99232 SBSQ HOSP IP/OBS MODERATE 35: CPT

## 2024-06-03 PROCEDURE — 99231 SBSQ HOSP IP/OBS SF/LOW 25: CPT

## 2024-06-03 RX ORDER — HYDROMORPHONE HYDROCHLORIDE 2 MG/ML
1 INJECTION INTRAMUSCULAR; INTRAVENOUS; SUBCUTANEOUS EVERY 4 HOURS
Refills: 0 | Status: DISCONTINUED | OUTPATIENT
Start: 2024-06-03 | End: 2024-06-04

## 2024-06-03 RX ORDER — CEFEPIME 1 G/1
1000 INJECTION, POWDER, FOR SOLUTION INTRAMUSCULAR; INTRAVENOUS ONCE
Refills: 0 | Status: COMPLETED | OUTPATIENT
Start: 2024-06-03 | End: 2024-06-03

## 2024-06-03 RX ORDER — CEFEPIME 1 G/1
INJECTION, POWDER, FOR SOLUTION INTRAMUSCULAR; INTRAVENOUS
Refills: 0 | Status: DISCONTINUED | OUTPATIENT
Start: 2024-06-03 | End: 2024-06-05

## 2024-06-03 RX ORDER — SENNA PLUS 8.6 MG/1
2 TABLET ORAL AT BEDTIME
Refills: 0 | Status: DISCONTINUED | OUTPATIENT
Start: 2024-06-03 | End: 2024-06-05

## 2024-06-03 RX ORDER — POLYETHYLENE GLYCOL 3350 17 G/17G
17 POWDER, FOR SOLUTION ORAL DAILY
Refills: 0 | Status: DISCONTINUED | OUTPATIENT
Start: 2024-06-03 | End: 2024-06-05

## 2024-06-03 RX ORDER — CEFEPIME 1 G/1
INJECTION, POWDER, FOR SOLUTION INTRAMUSCULAR; INTRAVENOUS
Refills: 0 | Status: DISCONTINUED | OUTPATIENT
Start: 2024-06-03 | End: 2024-06-03

## 2024-06-03 RX ORDER — CEFEPIME 1 G/1
1000 INJECTION, POWDER, FOR SOLUTION INTRAMUSCULAR; INTRAVENOUS EVERY 8 HOURS
Refills: 0 | Status: DISCONTINUED | OUTPATIENT
Start: 2024-06-03 | End: 2024-06-05

## 2024-06-03 RX ADMIN — Medication 100 MILLIGRAM(S): at 05:53

## 2024-06-03 RX ADMIN — Medication 100 MILLIGRAM(S): at 21:09

## 2024-06-03 RX ADMIN — GABAPENTIN 100 MILLIGRAM(S): 400 CAPSULE ORAL at 12:13

## 2024-06-03 RX ADMIN — CYCLOBENZAPRINE HYDROCHLORIDE 10 MILLIGRAM(S): 10 TABLET, FILM COATED ORAL at 05:46

## 2024-06-03 RX ADMIN — LOSARTAN POTASSIUM 25 MILLIGRAM(S): 100 TABLET, FILM COATED ORAL at 05:46

## 2024-06-03 RX ADMIN — Medication 1200 MILLIGRAM(S): at 09:37

## 2024-06-03 RX ADMIN — HEPARIN SODIUM 5000 UNIT(S): 5000 INJECTION INTRAVENOUS; SUBCUTANEOUS at 05:53

## 2024-06-03 RX ADMIN — LIDOCAINE 1 PATCH: 4 CREAM TOPICAL at 22:30

## 2024-06-03 RX ADMIN — HEPARIN SODIUM 5000 UNIT(S): 5000 INJECTION INTRAVENOUS; SUBCUTANEOUS at 22:30

## 2024-06-03 RX ADMIN — CEFEPIME 1000 MILLIGRAM(S): 1 INJECTION, POWDER, FOR SOLUTION INTRAMUSCULAR; INTRAVENOUS at 16:09

## 2024-06-03 RX ADMIN — Medication 1: at 17:36

## 2024-06-03 RX ADMIN — Medication 100 MILLIGRAM(S): at 05:51

## 2024-06-03 RX ADMIN — HYDROMORPHONE HYDROCHLORIDE 1 MILLIGRAM(S): 2 INJECTION INTRAMUSCULAR; INTRAVENOUS; SUBCUTANEOUS at 22:00

## 2024-06-03 RX ADMIN — INSULIN GLARGINE 10 UNIT(S): 100 INJECTION, SOLUTION SUBCUTANEOUS at 08:37

## 2024-06-03 RX ADMIN — Medication 3 MILLILITER(S): at 15:38

## 2024-06-03 RX ADMIN — HYDROMORPHONE HYDROCHLORIDE 1 MILLIGRAM(S): 2 INJECTION INTRAMUSCULAR; INTRAVENOUS; SUBCUTANEOUS at 01:46

## 2024-06-03 RX ADMIN — Medication 1 PATCH: at 20:10

## 2024-06-03 RX ADMIN — Medication 4 MILLILITER(S): at 10:04

## 2024-06-03 RX ADMIN — HYDROMORPHONE HYDROCHLORIDE 1 MILLIGRAM(S): 2 INJECTION INTRAMUSCULAR; INTRAVENOUS; SUBCUTANEOUS at 08:18

## 2024-06-03 RX ADMIN — HYDROMORPHONE HYDROCHLORIDE 1 MILLIGRAM(S): 2 INJECTION INTRAMUSCULAR; INTRAVENOUS; SUBCUTANEOUS at 02:46

## 2024-06-03 RX ADMIN — TRAMADOL HYDROCHLORIDE 50 MILLIGRAM(S): 50 TABLET ORAL at 12:13

## 2024-06-03 RX ADMIN — Medication 100 MILLIGRAM(S): at 16:09

## 2024-06-03 RX ADMIN — Medication 3 MILLILITER(S): at 19:55

## 2024-06-03 RX ADMIN — Medication 1: at 08:37

## 2024-06-03 RX ADMIN — HYDROMORPHONE HYDROCHLORIDE 1 MILLIGRAM(S): 2 INJECTION INTRAMUSCULAR; INTRAVENOUS; SUBCUTANEOUS at 16:13

## 2024-06-03 RX ADMIN — LIDOCAINE 1 PATCH: 4 CREAM TOPICAL at 09:00

## 2024-06-03 RX ADMIN — Medication 1: at 12:14

## 2024-06-03 RX ADMIN — Medication 1 PATCH: at 08:39

## 2024-06-03 RX ADMIN — Medication 3 MILLILITER(S): at 10:03

## 2024-06-03 RX ADMIN — ATORVASTATIN CALCIUM 80 MILLIGRAM(S): 80 TABLET, FILM COATED ORAL at 21:09

## 2024-06-03 RX ADMIN — Medication 4 MILLILITER(S): at 19:56

## 2024-06-03 RX ADMIN — HEPARIN SODIUM 5000 UNIT(S): 5000 INJECTION INTRAVENOUS; SUBCUTANEOUS at 16:08

## 2024-06-03 RX ADMIN — SENNA PLUS 2 TABLET(S): 8.6 TABLET ORAL at 21:09

## 2024-06-03 RX ADMIN — CEFEPIME 1000 MILLIGRAM(S): 1 INJECTION, POWDER, FOR SOLUTION INTRAMUSCULAR; INTRAVENOUS at 21:10

## 2024-06-03 RX ADMIN — Medication 1200 MILLIGRAM(S): at 21:09

## 2024-06-03 RX ADMIN — Medication 1 PATCH: at 07:00

## 2024-06-03 RX ADMIN — HYDROMORPHONE HYDROCHLORIDE 1 MILLIGRAM(S): 2 INJECTION INTRAMUSCULAR; INTRAVENOUS; SUBCUTANEOUS at 21:09

## 2024-06-03 RX ADMIN — Medication 4 MILLILITER(S): at 15:37

## 2024-06-03 RX ADMIN — LIDOCAINE 1 PATCH: 4 CREAM TOPICAL at 07:00

## 2024-06-03 NOTE — PROGRESS NOTE ADULT - ASSESSMENT
58 y/o male with PMHx of HTN, HLD, DM presents to the ED c/o left sided rib pain and difficulty breathing s/p fall in [facility] on Thursday. Pt states he slipped on wet surface near pool no head trauma and landed on left sided. Pt states pain gradually worse and worse with deep breath. Pt has been ambulating since then. Pt found to be hypoxic in ED., no leg pain, no chest pain, no numbness/tingling/weakness of extremities, no abdominal pain, no N/V/D      #Left 6-9th rib fractures s/p mechanical fall 5/30/24  Trauma team primary  Multimodal pain control   severe Dilaudid to 1mg q4 - unable to wean at this time. Will reevaluate tomorrow   moderate - tramadol 50mg BID  Mild - Tylenol/ibuprofen  Increased flexeril to 10mg TID for spasm  Continue gabapentin 100 q8   Continue lidocaine patch   Continue pulmonary toileting     #Community Acquired Pneumonia secondary to Rib fracture   Continue O2 therapy, wean o2 as tolerated   Currently on 3L O2 via nasal cannula SPO2 97%  Abx switched to Cefepime per ID  Incentive spirometry  Continue Duoneb nebs  Add mucinex and tessalon perles  Abdomen soft nondistended non tender  ID consulted recommendations appreciated  Patient with amoxicillin allergy    #Hypertension/Hyperlipidemia  Continue home losartan  Continue home statin     #History of nicotine dependence  Continue Nicotine patch    #Diabetes   Hold home meds  Monitor AC&HS   Place on insulin sliding Scale,     DVT ppx Heparin Subq     Thank You for the Consult Will continue to follow

## 2024-06-03 NOTE — PROGRESS NOTE ADULT - ASSESSMENT
58 y/o male with PMHx of HTN, HLD, DM presents to the ED c/o left sided rib pain and difficulty breathing s/p fall in [facility] on Thursday. Pt states he slipped on wet surface near pool no head trauma and landed on left sided. Pt states pain gradually worse with deep breath. CT imaging demonstrating Left 6-9th rib fractures.      A/P:  Left 6-9th rib fractures s/p mechanical fall 5/30/24  Possible pneumonia    Plan:  ID consult appreciated   Pulmonary consult appreciated   c/w IV antibiotics  Incentive spirometry  Monitor pulse ox and HD status  Hospitalist consult appreciated   DVT prophylaxis  multimodal Pain control  PT - no skilled needs    D/w Trauma team

## 2024-06-03 NOTE — PROGRESS NOTE ADULT - SUBJECTIVE AND OBJECTIVE BOX
Subjective:    pat better, sitting in bed, less congested, no new complaint.    Home Medications:  atorvastatin 80 mg oral tablet: 1 tab(s) orally once a day (01 Jun 2024 22:48)  cholecalciferol 25 mcg (1000 intl units) oral tablet: 1 tab(s) orally once a day (01 Jun 2024 22:47)  CoQ10 300 mg oral capsule: 1 cap(s) orally once a day (01 Jun 2024 22:47)  Januvia 100 mg oral tablet: 1 tab(s) orally once a day (01 Jun 2024 22:47)  Jardiance 25 mg oral tablet: 1 tab(s) orally once a day (01 Jun 2024 22:47)  olmesartan 5 mg oral tablet: 2 tab(s) orally once a day (01 Jun 2024 22:47)    MEDICATIONS  (STANDING):  acetylcysteine 20%  Inhalation 4 milliLiter(s) Inhalation every 6 hours  albuterol/ipratropium for Nebulization 3 milliLiter(s) Nebulizer every 6 hours  atorvastatin 80 milliGRAM(s) Oral at bedtime  benzonatate 100 milliGRAM(s) Oral three times a day  cefepime  Injectable. 1000 milliGRAM(s) IV Push every 8 hours  cefepime  Injectable.      dextrose 10% Bolus 125 milliLiter(s) IV Bolus once  dextrose 5%. 1000 milliLiter(s) (100 mL/Hr) IV Continuous <Continuous>  dextrose 5%. 1000 milliLiter(s) (50 mL/Hr) IV Continuous <Continuous>  dextrose 50% Injectable 25 Gram(s) IV Push once  dextrose 50% Injectable 12.5 Gram(s) IV Push once  glucagon  Injectable 1 milliGRAM(s) IntraMuscular once  guaiFENesin ER 1200 milliGRAM(s) Oral every 12 hours  heparin   Injectable 5000 Unit(s) SubCutaneous every 8 hours  insulin glargine Injectable (LANTUS) 10 Unit(s) SubCutaneous every morning  insulin lispro (ADMELOG) corrective regimen sliding scale   SubCutaneous three times a day before meals  lidocaine   4% Patch 1 Patch Transdermal every 24 hours  losartan 25 milliGRAM(s) Oral daily  nicotine -   7 mG/24Hr(s) Patch 1 Patch Transdermal daily  polyethylene glycol 3350 17 Gram(s) Oral daily  senna 2 Tablet(s) Oral at bedtime    MEDICATIONS  (PRN):  acetaminophen     Tablet .. 650 milliGRAM(s) Oral every 6 hours PRN Mild Pain (1 - 3)  cyclobenzaprine 10 milliGRAM(s) Oral three times a day PRN Muscle Spasm  dextrose Oral Gel 15 Gram(s) Oral once PRN Blood Glucose LESS THAN 70 milliGRAM(s)/deciliter  gabapentin 100 milliGRAM(s) Oral every 8 hours PRN nerve pain  HYDROmorphone  Injectable 1 milliGRAM(s) IV Push every 4 hours PRN Severe Pain (7 - 10)  ibuprofen  Tablet. 600 milliGRAM(s) Oral three times a day PRN Mild Pain (1 - 3)  LORazepam   Injectable 2 milliGRAM(s) IV Push every 1 hour PRN CIWA-Ar score 8 or greater  traMADol 50 milliGRAM(s) Oral two times a day PRN Moderate Pain (4 - 6)      Allergies    No Known Drug Allergies  Originally Entered as [Severe Rash] reaction to [adhesive tape] (Unknown)    Intolerances        Vital Signs Last 24 Hrs  T(C): 36.9 (03 Jun 2024 15:40), Max: 37 (02 Jun 2024 23:36)  T(F): 98.5 (03 Jun 2024 15:40), Max: 98.6 (02 Jun 2024 23:36)  HR: 76 (03 Jun 2024 16:39) (76 - 91)  BP: 143/67 (03 Jun 2024 15:40) (115/72 - 144/55)  BP(mean): --  RR: 18 (03 Jun 2024 15:40) (18 - 18)  SpO2: 92% (03 Jun 2024 15:40) (88% - 97%)    Parameters below as of 03 Jun 2024 15:40  Patient On (Oxygen Delivery Method): nasal cannula  O2 Flow (L/min): 2        PHYSICAL EXAMINATION:    NECK:  Supple. No lymphadenopathy. Jugular venous pressure not elevated. Carotids equal.   HEART:   The cardiac impulse has a normal quality. Reg., Nl S1 and S2.  There are no murmurs, rubs or gallops noted  CHEST:  Chest crackles to auscultation. Normal respiratory effort.  ABDOMEN:  Soft and nontender.   EXTREMITIES:  There is no edema.       LABS:                        15.2   16.52 )-----------( 125      ( 03 Jun 2024 07:13 )             44.0     06-03    133<L>  |  104  |  19  ----------------------------<  161<H>  4.0   |  25  |  0.70    Ca    8.6      03 Jun 2024 07:13  Phos  2.7     06-03  Mg     2.0     06-03    TPro  6.4  /  Alb  3.0<L>  /  TBili  0.9  /  DBili  x   /  AST  25  /  ALT  50  /  AlkPhos  82  06-03      Urinalysis Basic - ( 03 Jun 2024 07:13 )    Color: x / Appearance: x / SG: x / pH: x  Gluc: 161 mg/dL / Ketone: x  / Bili: x / Urobili: x   Blood: x / Protein: x / Nitrite: x   Leuk Esterase: x / RBC: x / WBC x   Sq Epi: x / Non Sq Epi: x / Bacteria: x

## 2024-06-03 NOTE — CONSULT NOTE ADULT - SUBJECTIVE AND OBJECTIVE BOX
Patient is a 59y old  Male who presents with a chief complaint of multiple rib fractures (02 Jun 2024 21:01)    HPI:    Pt presents to ED on 6/1 s/p mechanical fall. He returned from vacation in [facility] to [place] on 6/1/24 and presented to [place] ED for evaluation of left rib/chest pain post fall. Pt denied headstrike or LOC. Pt denied any other complaints. Of note imaging revealed left rib fractures and the patient has pleuritic chest pain and difficulty taking breaths. Denies fever or chills. He is a smoker of cigars which he has decided to not smoke anymore and states he carries a penicillin allergy on his medical record for which he vehemently denies, stating that he is NOT penicillin allergic.             PMH: as above  PSH: as above  Meds: per reconciliation sheet, noted below  MEDICATIONS  (STANDING):  acetylcysteine 20%  Inhalation 4 milliLiter(s) Inhalation every 6 hours  albuterol/ipratropium for Nebulization 3 milliLiter(s) Nebulizer every 6 hours  atorvastatin 80 milliGRAM(s) Oral at bedtime  benzonatate 100 milliGRAM(s) Oral three times a day  cefepime   IVPB      dextrose 10% Bolus 125 milliLiter(s) IV Bolus once  dextrose 5%. 1000 milliLiter(s) (100 mL/Hr) IV Continuous <Continuous>  dextrose 5%. 1000 milliLiter(s) (50 mL/Hr) IV Continuous <Continuous>  dextrose 50% Injectable 25 Gram(s) IV Push once  dextrose 50% Injectable 12.5 Gram(s) IV Push once  glucagon  Injectable 1 milliGRAM(s) IntraMuscular once  guaiFENesin ER 1200 milliGRAM(s) Oral every 12 hours  heparin   Injectable 5000 Unit(s) SubCutaneous every 8 hours  insulin glargine Injectable (LANTUS) 10 Unit(s) SubCutaneous every morning  insulin lispro (ADMELOG) corrective regimen sliding scale   SubCutaneous three times a day before meals  lidocaine   4% Patch 1 Patch Transdermal every 24 hours  losartan 25 milliGRAM(s) Oral daily  nicotine -   7 mG/24Hr(s) Patch 1 Patch Transdermal daily    MEDICATIONS  (PRN):  acetaminophen     Tablet .. 650 milliGRAM(s) Oral every 6 hours PRN Mild Pain (1 - 3)  cyclobenzaprine 10 milliGRAM(s) Oral three times a day PRN Muscle Spasm  dextrose Oral Gel 15 Gram(s) Oral once PRN Blood Glucose LESS THAN 70 milliGRAM(s)/deciliter  gabapentin 100 milliGRAM(s) Oral every 8 hours PRN nerve pain  HYDROmorphone  Injectable 1 milliGRAM(s) IV Push every 6 hours PRN Severe Pain (7 - 10)  ibuprofen  Tablet. 600 milliGRAM(s) Oral three times a day PRN Mild Pain (1 - 3)  LORazepam   Injectable 2 milliGRAM(s) IV Push every 1 hour PRN CIWA-Ar score 8 or greater  traMADol 50 milliGRAM(s) Oral two times a day PRN Moderate Pain (4 - 6)    Allergies    No Known Drug Allergies  Originally Entered as [Severe Rash] reaction to [adhesive tape] (Unknown)    Intolerances      Social: cigar smoking, no alcohol, no illegal drugs; positive recent travel, no exposure to TB  FAMILY HISTORY:  No pertinent family history in first degree relatives       no history of premature cardiovascular disease in first degree relatives  ROS: the patient denies fever, no chills, no HA, no dizziness, no sore throat, no blurry vision,  no palpitations,  no abdominal pain, no diarrhea, no N/V, no dysuria, no leg pain, no claudication, no rash, no joint aches, no rectal pain or bleeding, no night sweats  All other systems reviewed and are negative    Vital Signs Last 24 Hrs  T(C): 36.9 (03 Jun 2024 08:35), Max: 37.2 (02 Jun 2024 15:52)  T(F): 98.4 (03 Jun 2024 08:35), Max: 98.9 (02 Jun 2024 15:52)  HR: 81 (03 Jun 2024 08:35) (81 - 91)  BP: 134/57 (03 Jun 2024 08:35) (115/72 - 144/55)  BP(mean): --  RR: 18 (03 Jun 2024 08:35) (18 - 18)  SpO2: 88% (03 Jun 2024 11:41) (88% - 97%)    Parameters below as of 03 Jun 2024 11:41  Patient On (Oxygen Delivery Method): room air      Daily     Daily     PE:    Constitutional: frail looking  HEENT: NC/AT, EOMI, PERRLA, conjunctivae clear; ears and nose atraumatic; pharynx clear  Neck: supple; thyroid not palpable  Back: no tenderness  Respiratory: respiratory effort normal; bilateral coarse breath sounds  Cardiovascular: S1S2 regular, no murmurs  Abdomen: soft, not tender, not distended, positive BS; no liver or spleen organomegaly  Genitourinary: no suprapubic tenderness  Musculoskeletal: no muscle tenderness, no joint swelling or tenderness  Neurological/ Psychiatric: AxOx3, judgement and insight normal;  moving all extremities  Skin: no rashes; no palpable lesions    Labs: all available labs reviewed                        15.2   16.52 )-----------( 125      ( 03 Jun 2024 07:13 )             44.0     06-03    133<L>  |  104  |  19  ----------------------------<  161<H>  4.0   |  25  |  0.70    Ca    8.6      03 Jun 2024 07:13  Phos  2.7     06-03  Mg     2.0     06-03    TPro  6.4  /  Alb  3.0<L>  /  TBili  0.9  /  DBili  x   /  AST  25  /  ALT  50  /  AlkPhos  82  06-03     LIVER FUNCTIONS - ( 03 Jun 2024 07:13 )  Alb: 3.0 g/dL / Pro: 6.4 gm/dL / ALK PHOS: 82 U/L / ALT: 50 U/L / AST: 25 U/L / GGT: x           Urinalysis Basic - ( 03 Jun 2024 07:13 )    Color: x / Appearance: x / SG: x / pH: x  Gluc: 161 mg/dL / Ketone: x  / Bili: x / Urobili: x   Blood: x / Protein: x / Nitrite: x   Leuk Esterase: x / RBC: x / WBC x   Sq Epi: x / Non Sq Epi: x / Bacteria: x    < from: CT Angio Chest PE Protocol w/ IV Cont (06.01.24 @ 20:00) >    ACC: [id number] EXAM:  CT ANGIO CHEST PULM ART WAWI   ORDERED BY: SHERRY FUENTES     PROCEDURE DATE:  06/01/2024          INTERPRETATION:  CLINICAL INFORMATION: Shortness of breath.    COMPARISON: 8/11/2015.    CONTRAST/COMPLICATIONS:  IV Contrast:Omnipaque 350  70 cc administered   0 cc discarded  Oral Contrast: NONE  Complications: None reported at time of study completion    PROCEDURE:  CT Angiography of the Chest.  Sagittal and coronal reformats were performed as well as 3D (MIP)   reconstructions.    FINDINGS:    LUNGS AND AIRWAYS: Patent central airways.  .Peribronchial thickening   with mucoid plugging of bilateral lower lobe bronchi and lingular bronchi   with asymmetric patchy airspace opacity in the left lower lobe. Mild   right basilar dependent atelectasis. Mild upper lobe predominant   emphysema.  PLEURA: Trace left pleural effusion. No pneumothorax.  MEDIASTINUM AND DAVID: Nonspecific mildly prominent bilateral hilar lymph   nodes (left greater than right). No mediastinal lymphadenopathy.  VESSELS: Adequate pulmonary arterial opacification. No pulmonary   embolism. Main pulmonary artery is not dilated. Thoracic aorta is normal   in caliber. Atherosclerotic calcifications of the thoracic aorta, great   vessels, and coronary arteries.  HEART: Heart size is normal. Trace pericardial effusion. Calcifications   of the aortic valve leaflets.  CHEST WALL AND LOWER NECK: Within normal limits.  VISUALIZED UPPER ABDOMEN: Indeterminant cystic lesion arising from the   left kidney measuring up to 1.7 cm likely a hemorrhagic/proteinaceous   cyst which was also seen on prior CT of the abdomen and pelvis from from   2013.  BONES: Acute appearing mildly displaced left posterior sixth through   ninth rib fractures. Degenerativechanges of the spine. Sclerotic focus   in the L5 vertebral body is likely benign and was seen on prior exam from   2015.    IMPRESSION:  No pulmonary embolism.    Acute appearing mildly displaced left posterior sixth through ninth rib   fractures. No pneumothorax.    Peribronchial thickening with mucoid plugging of bilateral lower lobe   bronchi and lingular bronchi with asymmetric patchy airspace opacity in   the left lower lobe likely representing bronchopneumonia. Trace left   pleural effusion.      < end of copied text >        Radiology: all available radiological tests reviewed    Advanced directives addressed: full resuscitation

## 2024-06-03 NOTE — PROGRESS NOTE ADULT - SUBJECTIVE AND OBJECTIVE BOX
CC:Patient is a 59y old  Male who presents with a chief complaint of multiple rib fractures (03 Jun 2024 13:25)    Overnight: No acute events.    Subjective:  Pt seen and examined at bedside. Pt is AAOx3, pt in no acute distress. Endorsing improved Lt chest wall pain. Pt denied c/o fever, chills, chest pain, SOB, abd pain, N/V/D, extremity pain or dysfunction, hemoptysis, hematemesis, hematuria, hematochezia headache, diplopia, vertigo, dizziness.    ROS:  otherwise as abovementioned ROS    Vital Signs Last 24 Hrs  T(C): 36.9 (03 Jun 2024 15:40), Max: 37 (02 Jun 2024 23:36)  T(F): 98.5 (03 Jun 2024 15:40), Max: 98.6 (02 Jun 2024 23:36)  HR: 76 (03 Jun 2024 16:39) (76 - 91)  BP: 143/67 (03 Jun 2024 15:40) (115/72 - 144/55)  BP(mean): --  RR: 18 (03 Jun 2024 15:40) (18 - 18)  SpO2: 92% (03 Jun 2024 15:40) (88% - 97%)    Parameters below as of 03 Jun 2024 15:40  Patient On (Oxygen Delivery Method): nasal cannula  O2 Flow (L/min): 2      Labs:                15.2   16.52 )-----------( 125      ( 03 Jun 2024 07:13 )             44.0     CBC Full  -  ( 03 Jun 2024 07:13 )  WBC Count : 16.52 K/uL  RBC Count : 4.48 M/uL  Hemoglobin : 15.2 g/dL  Hematocrit : 44.0 %  Platelet Count - Automated : 125 K/uL  Mean Cell Volume : 98.2 fl  Mean Cell Hemoglobin : 33.9 pg  Mean Cell Hemoglobin Concentration : 34.5 gm/dL  Auto Neutrophil # : 13.46 K/uL  Auto Lymphocyte # : 1.23 K/uL  Auto Monocyte # : 1.59 K/uL  Auto Eosinophil # : 0.13 K/uL  Auto Basophil # : 0.04 K/uL  Auto Neutrophil % : 81.6 %  Auto Lymphocyte % : 7.4 %  Auto Monocyte % : 9.6 %  Auto Eosinophil % : 0.8 %  Auto Basophil % : 0.2 %    06-03    133<L>  |  104  |  19  ----------------------------<  161<H>  4.0   |  25  |  0.70    Ca    8.6      03 Jun 2024 07:13  Phos  2.7     06-03  Mg     2.0     06-03    TPro  6.4  /  Alb  3.0<L>  /  TBili  0.9  /  DBili  x   /  AST  25  /  ALT  50  /  AlkPhos  82  06-03    LIVER FUNCTIONS - ( 03 Jun 2024 07:13 )  Alb: 3.0 g/dL / Pro: 6.4 gm/dL / ALK PHOS: 82 U/L / ALT: 50 U/L / AST: 25 U/L / GGT: x                 Meds:  acetaminophen     Tablet .. 650 milliGRAM(s) Oral every 6 hours PRN  acetylcysteine 20%  Inhalation 4 milliLiter(s) Inhalation every 6 hours  albuterol/ipratropium for Nebulization 3 milliLiter(s) Nebulizer every 6 hours  atorvastatin 80 milliGRAM(s) Oral at bedtime  benzonatate 100 milliGRAM(s) Oral three times a day  cefepime  Injectable. 1000 milliGRAM(s) IV Push every 8 hours  cefepime  Injectable.      cyclobenzaprine 10 milliGRAM(s) Oral three times a day PRN  dextrose 10% Bolus 125 milliLiter(s) IV Bolus once  dextrose 5%. 1000 milliLiter(s) IV Continuous <Continuous>  dextrose 5%. 1000 milliLiter(s) IV Continuous <Continuous>  dextrose 50% Injectable 25 Gram(s) IV Push once  dextrose 50% Injectable 12.5 Gram(s) IV Push once  dextrose Oral Gel 15 Gram(s) Oral once PRN  gabapentin 100 milliGRAM(s) Oral every 8 hours PRN  glucagon  Injectable 1 milliGRAM(s) IntraMuscular once  guaiFENesin ER 1200 milliGRAM(s) Oral every 12 hours  heparin   Injectable 5000 Unit(s) SubCutaneous every 8 hours  HYDROmorphone  Injectable 1 milliGRAM(s) IV Push every 4 hours PRN  ibuprofen  Tablet. 600 milliGRAM(s) Oral three times a day PRN  insulin glargine Injectable (LANTUS) 10 Unit(s) SubCutaneous every morning  insulin lispro (ADMELOG) corrective regimen sliding scale   SubCutaneous three times a day before meals  lidocaine   4% Patch 1 Patch Transdermal every 24 hours  losartan 25 milliGRAM(s) Oral daily  nicotine -   7 mG/24Hr(s) Patch 1 Patch Transdermal daily  polyethylene glycol 3350 17 Gram(s) Oral daily  senna 2 Tablet(s) Oral at bedtime  traMADol 50 milliGRAM(s) Oral two times a day PRN        Physical exam:  GCS of 15  Pt is aaox3  Pt in no acute distress  Airway is patent  Breathing is symmetric and unlabored  Neuro: CN II-XII grossly intact  Psych: normal affect  HEENT: normocephalic, VANESSA, EOM wnl, no gross craniofacial bony pathology to exam  Neck: No tracheal deviation, no JVD, no crepitus, no ecchymosis, no hematoma  Chest: TTP over Lt chest wall. No gross rib or sternal pathology or tenderness to exam, no crepitus, no ecchymosis, no hematoma  Resp: Chest crackles to auscultation b/l.  CVS: S1S2(+)  ABD: bowel sounds (+), soft, nontender, non distended, no rebound, no guarding, no rigidity, no pelvic instability to exam  EXT: no calf tenderness or edema to exam b/l, pt has good capillary refill in all digits. Sensoromotor function grossly intact, on VTE prophylaxis  Skin: no adverse skin changes to exam

## 2024-06-03 NOTE — PROGRESS NOTE ADULT - ASSESSMENT
PROBLEMS:    Left 6-9th rib fractures s/p mechanical fall 5/30/24  Possible pneumonia    PLAN:    Pulmonary better  Iv cefepime as per ID  AEROSOLS  MUCOMYST/MUCINEX/[place] valve  Incentive spirometry  Monitor pulse ox and HD status  GI/DVT prophylaxis  Pain control  DVT PROPHYLASIX

## 2024-06-03 NOTE — CONSULT NOTE ADULT - ASSESSMENT
Pt presents to ED on 6/1 s/p mechanical fall. He returned from vacation in [facility] to [place] on 6/1/24 and presented to [facility] ED for evaluation of left rib/chest pain post fall. Pt denied headstrike or LOC. Pt denied any other complaints. Of note imaging revealed left rib fractures and the patient has pleuritic chest pain and difficulty taking breaths. Denies fever or chills. He is a smoker of cigars which he has decided to not smoke anymore and states he carries a penicillin allergy on his medical record for which he vehemently denies, stating that he is NOT penicillin allergic.    1. Patient admitted with pneumonia secondary to his mechanical fall and fractured ribs; also noted with leukocytosis which is a result of his fall and reactive to the pneumonia  - follow up cultures   - serial cbc and monitor temperature   - oxygen and nebs as needed   - reviewed prior medical records to evaluate for resistant or atypical pathogens   - iv hydration and supportive care   - will delete penicillin/amoxicillin allergy  - optimize antibiotics to cefepime, as patient is stopping smoking he will be having acute mucoid pneumonia and secretions, possibly gram negative pneumonia, not usual community acquired pneumonia  - encouraged incentive spirometry  2. other issues; per medicine    Clinical team may change from intravenous to oral antibiotics when the following criteria are met:   1. Patient is clinically improving/stable       a)	Improved signs and symptoms of infection from initial presentation       b)	Afebrile for 24 hours       c)	Leukocytosis trending towards normal range   2. Patient is tolerating oral intake   3. Initial/repeat blood cultures are negative OR do not need to wait for preliminary blood cultures to result    When above criteria met OR on date, may change iv antibiotics to: ceftin when patient is medically more stable

## 2024-06-03 NOTE — PROGRESS NOTE ADULT - SUBJECTIVE AND OBJECTIVE BOX
HOSPITALIST PROGRESS NOTE    CHIEF COMPLAINT: S/p mechanical fall, rib fx, pna    HISTORY OF THE PRESENT ILLNESS:  60 y/o male with PMHx of HTN, HLD, DM presented to the ED s/p mechanical fall [place] [facility] [place].  Pt states he slipped on wet surface near pool no head trauma and landed on left sided. Pt returned to [place] 6[place]1/24 and presented to [place] [facility] ED for evaluation of left rib/chest pain post fall.  Worse with inspiration. Pt denied headstrike or LOC. Pt denied any other complaints. Pt denied c/o fever, chills, abd pain, N/V/D, extremity pain or dysfunction, hemoptysis, hematemesis, hematuria, hematochexia, headache, diplopia, vertigo, dizzyness. Patient admitted to the trauma service hospitalist team consulted for pna and medical management     SUBJECTIVE / INTERVAL HPI: Patient seen and examined at bedside. Still with significant rib pain - controlled with IV pain medications. No new complaints.     ROS: All 10 systems reviewed and found to be negative with the exception of what has been described above.     VITAL SIGNS:  Vital Signs Last 24 Hrs  T(C): 36.9 (03 Jun 2024 15:40), Max: 37 (02 Jun 2024 23:36)  T(F): 98.5 (03 Jun 2024 15:40), Max: 98.6 (02 Jun 2024 23:36)  HR: 76 (03 Jun 2024 16:39) (76 - 91)  BP: 143/67 (03 Jun 2024 15:40) (115/72 - 144/55)  BP(mean): --  RR: 18 (03 Jun 2024 15:40) (18 - 18)  SpO2: 92% (03 Jun 2024 15:40) (88% - 97%)    Parameters below as of 03 Jun 2024 15:40  Patient On (Oxygen Delivery Method): nasal cannula  O2 Flow (L/min): 2      PHYSICAL EXAM:    General: Well developed, well nourished, no acute distress  HEENT: NC/AT; PERRL, anicteric sclera; MMM  Neck: Supple  Cardiovascular: +S1/S2, RRR, no murmurs, rubs, gallops  Respiratory: CTA B/L; no W/R/R  Chest wall: +TTP to left 6-9th rib   Gastrointestinal: soft, NT/ND; +BSx4  Extremities: WWP; no edema, clubbing or cyanosis  Vascular: 2+ radial, DP/PT pulses B/L  Neurological: AAOx3; no focal deficits    MEDICATIONS:  MEDICATIONS  (STANDING):  acetylcysteine 20%  Inhalation 4 milliLiter(s) Inhalation every 6 hours  albuterol/ipratropium for Nebulization 3 milliLiter(s) Nebulizer every 6 hours  atorvastatin 80 milliGRAM(s) Oral at bedtime  benzonatate 100 milliGRAM(s) Oral three times a day  cefepime  Injectable. 1000 milliGRAM(s) IV Push every 8 hours  cefepime  Injectable.      dextrose 10% Bolus 125 milliLiter(s) IV Bolus once  dextrose 5%. 1000 milliLiter(s) (100 mL/Hr) IV Continuous <Continuous>  dextrose 5%. 1000 milliLiter(s) (50 mL/Hr) IV Continuous <Continuous>  dextrose 50% Injectable 25 Gram(s) IV Push once  dextrose 50% Injectable 12.5 Gram(s) IV Push once  glucagon  Injectable 1 milliGRAM(s) IntraMuscular once  guaiFENesin ER 1200 milliGRAM(s) Oral every 12 hours  heparin   Injectable 5000 Unit(s) SubCutaneous every 8 hours  insulin glargine Injectable (LANTUS) 10 Unit(s) SubCutaneous every morning  insulin lispro (ADMELOG) corrective regimen sliding scale   SubCutaneous three times a day before meals  lidocaine   4% Patch 1 Patch Transdermal every 24 hours  losartan 25 milliGRAM(s) Oral daily  nicotine -   7 mG/24Hr(s) Patch 1 Patch Transdermal daily  polyethylene glycol 3350 17 Gram(s) Oral daily  senna 2 Tablet(s) Oral at bedtime    MEDICATIONS  (PRN):  acetaminophen     Tablet .. 650 milliGRAM(s) Oral every 6 hours PRN Mild Pain (1 - 3)  cyclobenzaprine 10 milliGRAM(s) Oral three times a day PRN Muscle Spasm  dextrose Oral Gel 15 Gram(s) Oral once PRN Blood Glucose LESS THAN 70 milliGRAM(s)/deciliter  gabapentin 100 milliGRAM(s) Oral every 8 hours PRN nerve pain  HYDROmorphone  Injectable 1 milliGRAM(s) IV Push every 4 hours PRN Severe Pain (7 - 10)  ibuprofen  Tablet. 600 milliGRAM(s) Oral three times a day PRN Mild Pain (1 - 3)  traMADol 50 milliGRAM(s) Oral two times a day PRN Moderate Pain (4 - 6)      ALLERGIES:  Allergies    No Known Drug Allergies  Originally Entered as [Severe Rash] reaction to [adhesive tape] (Unknown)    Intolerances        LABS:                        15.2   16.52 )-----------( 125      ( 03 Jun 2024 07:13 )             44.0     06-03    133<L>  |  104  |  19  ----------------------------<  161<H>  4.0   |  25  |  0.70    Ca    8.6      03 Jun 2024 07:13  Phos  2.7     06-03  Mg     2.0     06-03    TPro  6.4  /  Alb  3.0<L>  /  TBili  0.9  /  DBili  x   /  AST  25  /  ALT  50  /  AlkPhos  82  06-03      Urinalysis Basic - ( 03 Jun 2024 07:13 )    Color: x / Appearance: x / SG: x / pH: x  Gluc: 161 mg/dL / Ketone: x  / Bili: x / Urobili: x   Blood: x / Protein: x / Nitrite: x   Leuk Esterase: x / RBC: x / WBC x   Sq Epi: x / Non Sq Epi: x / Bacteria: x      CAPILLARY BLOOD GLUCOSE      POCT Blood Glucose.: 198 mg/dL (03 Jun 2024 17:29)        RADIOLOGY & ADDITIONAL TESTS: Reviewed.

## 2024-06-04 LAB
ANION GAP SERPL CALC-SCNC: 4 MMOL/L — LOW (ref 5–17)
BUN SERPL-MCNC: 26 MG/DL — HIGH (ref 7–23)
CALCIUM SERPL-MCNC: 9.3 MG/DL — SIGNIFICANT CHANGE UP (ref 8.5–10.1)
CHLORIDE SERPL-SCNC: 107 MMOL/L — SIGNIFICANT CHANGE UP (ref 96–108)
CO2 SERPL-SCNC: 26 MMOL/L — SIGNIFICANT CHANGE UP (ref 22–31)
CREAT SERPL-MCNC: 0.74 MG/DL — SIGNIFICANT CHANGE UP (ref 0.5–1.3)
EGFR: 103 ML/MIN/1.73M2 — SIGNIFICANT CHANGE UP
GLUCOSE BLDC GLUCOMTR-MCNC: 140 MG/DL — HIGH (ref 70–99)
GLUCOSE BLDC GLUCOMTR-MCNC: 154 MG/DL — HIGH (ref 70–99)
GLUCOSE BLDC GLUCOMTR-MCNC: 164 MG/DL — HIGH (ref 70–99)
GLUCOSE SERPL-MCNC: 160 MG/DL — HIGH (ref 70–99)
HCT VFR BLD CALC: 45.8 % — SIGNIFICANT CHANGE UP (ref 39–50)
HGB BLD-MCNC: 15.5 G/DL — SIGNIFICANT CHANGE UP (ref 13–17)
MAGNESIUM SERPL-MCNC: 2 MG/DL — SIGNIFICANT CHANGE UP (ref 1.6–2.6)
MCHC RBC-ENTMCNC: 33.6 PG — SIGNIFICANT CHANGE UP (ref 27–34)
MCHC RBC-ENTMCNC: 33.8 GM/DL — SIGNIFICANT CHANGE UP (ref 32–36)
MCV RBC AUTO: 99.3 FL — SIGNIFICANT CHANGE UP (ref 80–100)
PHOSPHATE SERPL-MCNC: 3.5 MG/DL — SIGNIFICANT CHANGE UP (ref 2.5–4.5)
PLATELET # BLD AUTO: 141 K/UL — LOW (ref 150–400)
POTASSIUM SERPL-MCNC: 4.2 MMOL/L — SIGNIFICANT CHANGE UP (ref 3.5–5.3)
POTASSIUM SERPL-SCNC: 4.2 MMOL/L — SIGNIFICANT CHANGE UP (ref 3.5–5.3)
RBC # BLD: 4.61 M/UL — SIGNIFICANT CHANGE UP (ref 4.2–5.8)
RBC # FLD: 14.6 % — HIGH (ref 10.3–14.5)
SODIUM SERPL-SCNC: 137 MMOL/L — SIGNIFICANT CHANGE UP (ref 135–145)
WBC # BLD: 14.23 K/UL — HIGH (ref 3.8–10.5)
WBC # FLD AUTO: 14.23 K/UL — HIGH (ref 3.8–10.5)

## 2024-06-04 PROCEDURE — 71045 X-RAY EXAM CHEST 1 VIEW: CPT | Mod: 26

## 2024-06-04 PROCEDURE — 99232 SBSQ HOSP IP/OBS MODERATE 35: CPT

## 2024-06-04 RX ORDER — LISINOPRIL 2.5 MG/1
40 TABLET ORAL DAILY
Refills: 0 | Status: DISCONTINUED | OUTPATIENT
Start: 2024-06-04 | End: 2024-06-05

## 2024-06-04 RX ORDER — FUROSEMIDE 40 MG
40 TABLET ORAL ONCE
Refills: 0 | Status: COMPLETED | OUTPATIENT
Start: 2024-06-04 | End: 2024-06-04

## 2024-06-04 RX ORDER — HYDROMORPHONE HYDROCHLORIDE 2 MG/ML
0.5 INJECTION INTRAMUSCULAR; INTRAVENOUS; SUBCUTANEOUS EVERY 4 HOURS
Refills: 0 | Status: DISCONTINUED | OUTPATIENT
Start: 2024-06-04 | End: 2024-06-05

## 2024-06-04 RX ADMIN — Medication 4 MILLILITER(S): at 07:43

## 2024-06-04 RX ADMIN — Medication 3 MILLILITER(S): at 07:43

## 2024-06-04 RX ADMIN — CEFEPIME 1000 MILLIGRAM(S): 1 INJECTION, POWDER, FOR SOLUTION INTRAMUSCULAR; INTRAVENOUS at 14:33

## 2024-06-04 RX ADMIN — Medication 4 MILLILITER(S): at 13:32

## 2024-06-04 RX ADMIN — SENNA PLUS 2 TABLET(S): 8.6 TABLET ORAL at 21:04

## 2024-06-04 RX ADMIN — Medication 1 PATCH: at 09:32

## 2024-06-04 RX ADMIN — CEFEPIME 1000 MILLIGRAM(S): 1 INJECTION, POWDER, FOR SOLUTION INTRAMUSCULAR; INTRAVENOUS at 21:04

## 2024-06-04 RX ADMIN — Medication 3 MILLILITER(S): at 20:00

## 2024-06-04 RX ADMIN — HYDROMORPHONE HYDROCHLORIDE 1 MILLIGRAM(S): 2 INJECTION INTRAMUSCULAR; INTRAVENOUS; SUBCUTANEOUS at 12:29

## 2024-06-04 RX ADMIN — Medication 3 MILLILITER(S): at 13:32

## 2024-06-04 RX ADMIN — HYDROMORPHONE HYDROCHLORIDE 1 MILLIGRAM(S): 2 INJECTION INTRAMUSCULAR; INTRAVENOUS; SUBCUTANEOUS at 12:45

## 2024-06-04 RX ADMIN — Medication 100 MILLIGRAM(S): at 06:22

## 2024-06-04 RX ADMIN — HEPARIN SODIUM 5000 UNIT(S): 5000 INJECTION INTRAVENOUS; SUBCUTANEOUS at 14:34

## 2024-06-04 RX ADMIN — GABAPENTIN 100 MILLIGRAM(S): 400 CAPSULE ORAL at 14:33

## 2024-06-04 RX ADMIN — INSULIN GLARGINE 10 UNIT(S): 100 INJECTION, SOLUTION SUBCUTANEOUS at 08:23

## 2024-06-04 RX ADMIN — HEPARIN SODIUM 5000 UNIT(S): 5000 INJECTION INTRAVENOUS; SUBCUTANEOUS at 21:04

## 2024-06-04 RX ADMIN — LOSARTAN POTASSIUM 25 MILLIGRAM(S): 100 TABLET, FILM COATED ORAL at 09:31

## 2024-06-04 RX ADMIN — TRAMADOL HYDROCHLORIDE 50 MILLIGRAM(S): 50 TABLET ORAL at 09:31

## 2024-06-04 RX ADMIN — Medication 100 MILLIGRAM(S): at 14:33

## 2024-06-04 RX ADMIN — Medication 40 MILLIGRAM(S): at 21:04

## 2024-06-04 RX ADMIN — Medication 1: at 08:22

## 2024-06-04 RX ADMIN — Medication 1200 MILLIGRAM(S): at 09:31

## 2024-06-04 RX ADMIN — Medication 1 PATCH: at 21:25

## 2024-06-04 RX ADMIN — ATORVASTATIN CALCIUM 80 MILLIGRAM(S): 80 TABLET, FILM COATED ORAL at 21:04

## 2024-06-04 RX ADMIN — POLYETHYLENE GLYCOL 3350 17 GRAM(S): 17 POWDER, FOR SOLUTION ORAL at 09:30

## 2024-06-04 RX ADMIN — HEPARIN SODIUM 5000 UNIT(S): 5000 INJECTION INTRAVENOUS; SUBCUTANEOUS at 06:25

## 2024-06-04 RX ADMIN — Medication 1 PATCH: at 09:00

## 2024-06-04 RX ADMIN — HYDROMORPHONE HYDROCHLORIDE 0.5 MILLIGRAM(S): 2 INJECTION INTRAMUSCULAR; INTRAVENOUS; SUBCUTANEOUS at 21:03

## 2024-06-04 RX ADMIN — Medication 4 MILLILITER(S): at 20:00

## 2024-06-04 RX ADMIN — LIDOCAINE 1 PATCH: 4 CREAM TOPICAL at 06:12

## 2024-06-04 RX ADMIN — HYDROMORPHONE HYDROCHLORIDE 1 MILLIGRAM(S): 2 INJECTION INTRAMUSCULAR; INTRAVENOUS; SUBCUTANEOUS at 06:23

## 2024-06-04 RX ADMIN — Medication 1200 MILLIGRAM(S): at 21:04

## 2024-06-04 RX ADMIN — Medication 1: at 12:32

## 2024-06-04 RX ADMIN — TRAMADOL HYDROCHLORIDE 50 MILLIGRAM(S): 50 TABLET ORAL at 10:15

## 2024-06-04 RX ADMIN — Medication 100 MILLIGRAM(S): at 21:04

## 2024-06-04 RX ADMIN — Medication 1 PATCH: at 06:11

## 2024-06-04 RX ADMIN — CEFEPIME 1000 MILLIGRAM(S): 1 INJECTION, POWDER, FOR SOLUTION INTRAMUSCULAR; INTRAVENOUS at 06:26

## 2024-06-04 RX ADMIN — LIDOCAINE 1 PATCH: 4 CREAM TOPICAL at 22:05

## 2024-06-04 NOTE — DIETITIAN NUTRITION RISK NOTIFICATION - ADDITIONAL COMMENTS/DIETITIAN RECOMMENDATIONS
1) Rec'd to change diet to CHO Consistent w/ evening snack  2) Add premier protein shake TID (provides 160kcal, 30g protein)  3) Monitor bowel movements, if no BM for >3 days, consider implementing bowel regimen.  4) Encourage protein-rich foods, maximize food preferences  5) MVI w/ minerals daily to ensure 100% RDA met  6) Consider adding thiamine 100 mg daily 2/2 poor PO intake/ malnutrition  7) Monitor and optimize BG levels between 140-180 mg/dL by medical management  8) Monitor lytes/ min and replete prn.  9) Encourage f/u with outpatient dietitian and endocrinologist for diabetes management  10) Confirm goals of care regarding nutrition support  RD will continue to monitor PO intake, labs, hydration, and wt prn.

## 2024-06-04 NOTE — PROGRESS NOTE ADULT - NUTRITIONAL ASSESSMENT
This patient has been assessed with a concern for Malnutrition and has been determined to have a diagnosis/diagnoses of Severe protein-calorie malnutrition.    This patient is being managed with:   Diet Consistent Carbohydrate Gestational w/3 Snacks-  Entered: Jun 1 2024 10:06PM    The following pending diet order is being considered for treatment of Severe protein-calorie malnutrition:  Diet Consistent Carbohydrate w/Evening Snack-  Supplement Feeding Modality:  Oral  Ensure Max Cans or Servings Per Day:  3       Frequency:  Three Times a day  Entered: Jun 4 2024  1:27PM

## 2024-06-04 NOTE — DIETITIAN INITIAL EVALUATION ADULT - ORAL INTAKE PTA/DIET HISTORY
Pt lives at home w/ wife and mother; wife normally cooks/grocery shops w/o difficulty. Endorses decreased appetite and often skips meals, likely meeting </=50% of ENN chronically 2/2 reports DM meds (metformin, Ozempic affected appetite) --> both discontinued, now taking Jardiance at home. Measures BGL via finger sticks, now recently f/u w/ endocrinologist outpatient.

## 2024-06-04 NOTE — PROGRESS NOTE ADULT - ASSESSMENT
Pt presents to ED on 6/1 s/p mechanical fall. He returned from vacation in [facility] to [place] on 6[place]24 and presented to [facility] ED for evaluation of left rib/chest pain post fall. Pt denied headstrike or LOC. Pt denied any other complaints. Of note imaging revealed left rib fractures and the patient has pleuritic chest pain and difficulty taking breaths. Denies fever or chills. He is a smoker of cigars which he has decided to not smoke anymore and states he carries a penicillin allergy on his medical record for which he vehemently denies, stating that he is NOT penicillin allergic.    1. Patient admitted with pneumonia secondary to his mechanical fall and fractured ribs; also noted with leukocytosis which is a result of his fall and reactive to the pneumonia  - follow up cultures   - serial cbc and monitor temperature   - oxygen and nebs as needed   - reviewed prior medical records to evaluate for resistant or atypical pathogens   - iv hydration and supportive care   - will delete penicillin/amoxicillin allergy  - optimize antibiotics to cefepime, as patient is stopping smoking he will be having acute mucoid pneumonia and secretions, possibly gram negative pneumonia, not usual community acquired pneumonia, day #2  - tolerating antibiotics without rashes or side effects   - encouraged incentive spirometry  2. other issues; per medicine    Clinical team may change from intravenous to oral antibiotics when the following criteria are met:   1. Patient is clinically improving/stable       a)	Improved signs and symptoms of infection from initial presentation       b)	Afebrile for 24 hours       c)	Leukocytosis trending towards normal range   2. Patient is tolerating oral intake   3. Initial/repeat blood cultures are negative OR do not need to wait for preliminary blood cultures to result    When above criteria met OR on date, may change iv antibiotics to: ceftin when patient is medically more stable, possibly tomorrow afternoon, for another 7 days ceftin 500 mg po q 12 hours

## 2024-06-04 NOTE — DIETITIAN INITIAL EVALUATION ADULT - PERTINENT MEDS FT
MEDICATIONS  (STANDING):  acetylcysteine 20%  Inhalation 4 milliLiter(s) Inhalation every 6 hours  albuterol/ipratropium for Nebulization 3 milliLiter(s) Nebulizer every 6 hours  atorvastatin 80 milliGRAM(s) Oral at bedtime  benzonatate 100 milliGRAM(s) Oral three times a day  cefepime  Injectable. 1000 milliGRAM(s) IV Push every 8 hours  cefepime  Injectable.      dextrose 10% Bolus 125 milliLiter(s) IV Bolus once  dextrose 5%. 1000 milliLiter(s) (100 mL/Hr) IV Continuous <Continuous>  dextrose 5%. 1000 milliLiter(s) (50 mL/Hr) IV Continuous <Continuous>  dextrose 50% Injectable 25 Gram(s) IV Push once  dextrose 50% Injectable 12.5 Gram(s) IV Push once  glucagon  Injectable 1 milliGRAM(s) IntraMuscular once  guaiFENesin ER 1200 milliGRAM(s) Oral every 12 hours  heparin   Injectable 5000 Unit(s) SubCutaneous every 8 hours  insulin glargine Injectable (LANTUS) 10 Unit(s) SubCutaneous every morning  insulin lispro (ADMELOG) corrective regimen sliding scale   SubCutaneous three times a day before meals  lidocaine   4% Patch 1 Patch Transdermal every 24 hours  losartan 25 milliGRAM(s) Oral daily  nicotine -   7 mG/24Hr(s) Patch 1 Patch Transdermal daily  polyethylene glycol 3350 17 Gram(s) Oral daily  senna 2 Tablet(s) Oral at bedtime    MEDICATIONS  (PRN):  acetaminophen     Tablet .. 650 milliGRAM(s) Oral every 6 hours PRN Mild Pain (1 - 3)  cyclobenzaprine 10 milliGRAM(s) Oral three times a day PRN Muscle Spasm  dextrose Oral Gel 15 Gram(s) Oral once PRN Blood Glucose LESS THAN 70 milliGRAM(s)/deciliter  gabapentin 100 milliGRAM(s) Oral every 8 hours PRN nerve pain  HYDROmorphone  Injectable 1 milliGRAM(s) IV Push every 4 hours PRN Severe Pain (7 - 10)  ibuprofen  Tablet. 600 milliGRAM(s) Oral three times a day PRN Mild Pain (1 - 3)  traMADol 50 milliGRAM(s) Oral two times a day PRN Moderate Pain (4 - 6)

## 2024-06-04 NOTE — PROGRESS NOTE ADULT - SUBJECTIVE AND OBJECTIVE BOX
Date of service: 06-04-24 @ 11:06      Patient sitting in chair; has productive cough; feels hot      ROS: no fever or chills; denies dizziness, no HA, no abdominal pain, no diarrhea or constipation; no dysuria, no urinary frequency, no legs pain, no rashes    MEDICATIONS  (STANDING):  acetylcysteine 20%  Inhalation 4 milliLiter(s) Inhalation every 6 hours  albuterol/ipratropium for Nebulization 3 milliLiter(s) Nebulizer every 6 hours  atorvastatin 80 milliGRAM(s) Oral at bedtime  benzonatate 100 milliGRAM(s) Oral three times a day  cefepime  Injectable. 1000 milliGRAM(s) IV Push every 8 hours  cefepime  Injectable.      dextrose 10% Bolus 125 milliLiter(s) IV Bolus once  dextrose 5%. 1000 milliLiter(s) (100 mL/Hr) IV Continuous <Continuous>  dextrose 5%. 1000 milliLiter(s) (50 mL/Hr) IV Continuous <Continuous>  dextrose 50% Injectable 25 Gram(s) IV Push once  dextrose 50% Injectable 12.5 Gram(s) IV Push once  glucagon  Injectable 1 milliGRAM(s) IntraMuscular once  guaiFENesin ER 1200 milliGRAM(s) Oral every 12 hours  heparin   Injectable 5000 Unit(s) SubCutaneous every 8 hours  insulin glargine Injectable (LANTUS) 10 Unit(s) SubCutaneous every morning  insulin lispro (ADMELOG) corrective regimen sliding scale   SubCutaneous three times a day before meals  lidocaine   4% Patch 1 Patch Transdermal every 24 hours  losartan 25 milliGRAM(s) Oral daily  nicotine -   7 mG/24Hr(s) Patch 1 Patch Transdermal daily  polyethylene glycol 3350 17 Gram(s) Oral daily  senna 2 Tablet(s) Oral at bedtime    MEDICATIONS  (PRN):  acetaminophen     Tablet .. 650 milliGRAM(s) Oral every 6 hours PRN Mild Pain (1 - 3)  cyclobenzaprine 10 milliGRAM(s) Oral three times a day PRN Muscle Spasm  dextrose Oral Gel 15 Gram(s) Oral once PRN Blood Glucose LESS THAN 70 milliGRAM(s)/deciliter  gabapentin 100 milliGRAM(s) Oral every 8 hours PRN nerve pain  HYDROmorphone  Injectable 1 milliGRAM(s) IV Push every 4 hours PRN Severe Pain (7 - 10)  ibuprofen  Tablet. 600 milliGRAM(s) Oral three times a day PRN Mild Pain (1 - 3)  traMADol 50 milliGRAM(s) Oral two times a day PRN Moderate Pain (4 - 6)      Vital Signs Last 24 Hrs  T(C): 36.7 (04 Jun 2024 08:05), Max: 36.9 (03 Jun 2024 15:40)  T(F): 98 (04 Jun 2024 08:05), Max: 98.5 (03 Jun 2024 15:40)  HR: 84 (04 Jun 2024 08:05) (76 - 91)  BP: 129/71 (04 Jun 2024 08:05) (129/71 - 146/76)  BP(mean): --  RR: 18 (04 Jun 2024 08:05) (18 - 18)  SpO2: 94% (04 Jun 2024 08:05) (88% - 99%)    Parameters below as of 04 Jun 2024 07:43  Patient On (Oxygen Delivery Method): nasal cannula, 3lpm            Physical Exam:            PE:    Constitutional: frail looking  HEENT: NC/AT, EOMI, PERRLA, conjunctivae clear; ears and nose atraumatic; pharynx clear  Neck: supple; thyroid not palpable  Back: no tenderness  Respiratory: respiratory effort normal; bilateral coarse breath sounds, but better aeration  Cardiovascular: S1S2 regular, no murmurs  Abdomen: soft, not tender, not distended, positive BS; no liver or spleen organomegaly  Genitourinary: no suprapubic tenderness  Musculoskeletal: no muscle tenderness, no joint swelling or tenderness  Neurological/ Psychiatric: AxOx3, judgement and insight normal;  moving all extremities  Skin: no rashes; no palpable lesions    Labs: all available labs reviewed                          Labs:                        15.5   14.23 )-----------( 141      ( 04 Jun 2024 08:31 )             45.8     06-04    137  |  107  |  26<H>  ----------------------------<  160<H>  4.2   |  26  |  0.74    Ca    9.3      04 Jun 2024 08:31  Phos  3.5     06-04  Mg     2.0     06-04    TPro  6.4  /  Alb  3.0<L>  /  TBili  0.9  /  DBili  x   /  AST  25  /  ALT  50  /  AlkPhos  82  06-03           Cultures:       Culture - Blood (collected 06-01-24 @ 21:22)  Source: .Blood Blood  Preliminary Report (06-04-24 @ 09:01):    No growth at 48 Hours    Culture - Blood (collected 06-01-24 @ 21:22)  Source: .Blood Blood  Preliminary Report (06-04-24 @ 09:01):    No growth at 48 Hours                  < from: CT Angio Chest PE Protocol w/ IV Cont (06.01.24 @ 20:00) >    ACC: [id number] EXAM:  CT ANGIO CHEST PULM [facility]   ORDERED BY: SHERRY FUENTES     PROCEDURE DATE:  06/01/2024          INTERPRETATION:  CLINICAL INFORMATION: Shortness of breath.    COMPARISON: 8/11/2015.    CONTRAST/COMPLICATIONS:  IV Contrast:Omnipaque 350  70 cc administered   0 cc discarded  Oral Contrast: NONE  Complications: None reported at time of study completion    PROCEDURE:  CT Angiography of the Chest.  Sagittal and coronal reformats were performed as well as 3D (MIP)   reconstructions.    FINDINGS:    LUNGS AND AIRWAYS: Patent central airways.  .Peribronchial thickening   with mucoid plugging of bilateral lower lobe bronchi and lingular bronchi   with asymmetric patchy airspace opacity in the left lower lobe. Mild   right basilar dependent atelectasis. Mild upper lobe predominant   emphysema.  PLEURA: Trace left pleural effusion. No pneumothorax.  MEDIASTINUM AND DAVID: Nonspecific mildly prominent bilateral hilar lymph   nodes (left greater than right). No mediastinal lymphadenopathy.  VESSELS: Adequate pulmonary arterial opacification. No pulmonary   embolism. Main pulmonary artery is not dilated. Thoracic aorta is normal   in caliber. Atherosclerotic calcifications of the thoracic aorta, great   vessels, and coronary arteries.  HEART: Heart size is normal. Trace pericardial effusion. Calcifications   of the aortic valve leaflets.  CHEST WALL AND LOWER NECK: Within normal limits.  VISUALIZED UPPER ABDOMEN: Indeterminant cystic lesion arising from the   left kidney measuring up to 1.7 cm likely a hemorrhagic/proteinaceous   cyst which was also seen on prior CT of the abdomen and pelvis from from   2013.  BONES: Acute appearing mildly displaced left posterior sixth through   ninth rib fractures. Degenerativechanges of the spine. Sclerotic focus   in the L5 vertebral body is likely benign and was seen on prior exam from   2015.    IMPRESSION:  No pulmonary embolism.    Acute appearing mildly displaced left posterior sixth through ninth rib   fractures. No pneumothorax.    Peribronchial thickening with mucoid plugging of bilateral lower lobe   bronchi and lingular bronchi with asymmetric patchy airspace opacity in   the left lower lobe likely representing bronchopneumonia. Trace left   pleural effusion.      < end of copied text >        Radiology: all available radiological tests reviewed    Advanced directives addressed: full resuscitation

## 2024-06-04 NOTE — DIETITIAN INITIAL EVALUATION ADULT - PERTINENT LABORATORY DATA
06-04    137  |  107  |  26<H>  ----------------------------<  160<H>  4.2   |  26  |  0.74    Ca    9.3      04 Jun 2024 08:31  Phos  3.5     06-04  Mg     2.0     06-04    TPro  6.4  /  Alb  3.0<L>  /  TBili  0.9  /  DBili  x   /  AST  25  /  ALT  50  /  AlkPhos  82  06-03  POCT Blood Glucose.: 164 mg/dL (06-04-24 @ 11:40)  A1C with Estimated Average Glucose Result: 6.7 % (06-02-24 @ 09:12)

## 2024-06-04 NOTE — DIETITIAN INITIAL EVALUATION ADULT - OTHER INFO
Pt presents to ED s/p mechanical fall while on vacation in [facility] on 5/30/24. [place] 6/1/24 and presented to [place] ED for evaluation of left rib/chest pain post fall.   Admit for poss PNA, rib fractures s/p mech fall     W/ T2DM - 1 POCT elevated x 24 hrs (164, 154, 198, 176) and hgb A1C of 6.7% on 6/2/24 - w/ good glycemic control for age. Given 4 units of corrective insulin and 10 units of lantus x 24 hrs. Reports now w/ good appetite, "eating well and is starving." Reports UBW of 238# x 7 years ago --> 165# x 1 year ago; now wt at ~155#. Bed scale wt of 151# taken by RD on 6/4/24. Unintentional wt loss of 14# / 8.5% wt loss x 1 year ago ; not clinically significant. NFPE reveals moderate-severe muscle/ fat wasting - appears thin and bri. Currently on CHO Consistent Gestational w/ 3 snacks - will change to CHO Consistent w/ evening snack; rec'd to liberalize to regular diet if PO intake decreases. Add premier protein shake TID (provides 160kcal, 30g protein) - pt is receptive. RD provided verbal and written nutrition education on overall healthy eating, importance of monitoring BGL and target BGL, importance of consistent meal times and foods containing CHO - pt is receptive. RD also provided verbal and written information to f/u with outpatient dietitian - pt is receptive. See below for other recommendations.

## 2024-06-04 NOTE — PROGRESS NOTE ADULT - ASSESSMENT
60 y/o male with PMHx of HTN, HLD, DM presents to the ED c/o left sided rib pain and difficulty breathing s/p fall in [facility] on Thursday. Pt states he slipped on wet surface near pool no head trauma and landed on left sided. Pt states pain gradually worse and worse with deep breath. Pt has been ambulating since then. Pt found to be hypoxic in ED., no leg pain, no chest pain, no numbness/tingling/weakness of extremities, no abdominal pain, no N/V/D      #Left 6-9th rib fractures s/p mechanical fall 5/30/24  Trauma team primary  Multimodal pain control   severe Dilaudid to 1mg q4 - unable to wean at this time. Will reevaluate tomorrow   moderate - tramadol 50mg BID  Mild - Tylenol/ibuprofen  Increased flexeril to 10mg TID for spasm  Continue gabapentin 100 q8   Continue lidocaine patch   Continue pulmonary toileting     #Community Acquired Pneumonia secondary to Rib fracture   Continue O2 therapy, wean o2 as tolerated   Currently on 3L O2 via nasal cannula SPO2 97%  Abx switched to Cefepime per ID  Incentive spirometry  Continue Duoneb nebs  Add mucinex and tessalon perles  Abdomen soft nondistended non tender  ID consulted recommendations appreciated  Patient with amoxicillin allergy    #Hypertension/Hyperlipidemia  Continue home losartan  Continue home statin     #History of nicotine dependence  Continue Nicotine patch    #Diabetes   Hold home meds  Monitor AC&HS   Place on insulin sliding Scale,     DVT ppx Heparin Subq     Thank You for the Consult Will continue to follow    58 y/o male with PMHx of HTN, HLD, DM presents to the ED c/o left sided rib pain and difficulty breathing s/p fall in [facility] on Thursday. Pt states he slipped on wet surface near pool no head trauma and landed on left sided. Pt states pain gradually worse and worse with deep breath. Pt has been ambulating since then. Pt found to be hypoxic in ED., no leg pain, no chest pain, no numbness/tingling/weakness of extremities, no abdominal pain, no N/V/D      #Left 6-9th rib fractures s/p mechanical fall 5/30/24  #Left pleural effusion  Trauma team primary  Multimodal pain control   Decrease Dilaudid 1 mg q4 to 0.5 q4 with plan to transition to oral tomorrow   moderate - tramadol 50mg BID  Mild - Tylenol/ibuprofen  Increased flexeril to 10mg TID for spasm  Lasix 40 IVP x1 given today, monitor response  Continue gabapentin 100 q8   Continue lidocaine patch   Continue pulmonary toileting     #Community Acquired Pneumonia secondary to Rib fracture   Continue O2 therapy, wean o2 as tolerated   Currently on 3L O2 via nasal cannula SPO2 97%  Abx switched to Cefepime per ID  Incentive spirometry  Continue Duoneb nebs  Add mucinex and tessalon perles  Abdomen soft nondistended non tender  ID consulted recommendations appreciated  Patient with amoxicillin allergy    #Hypertension/Hyperlipidemia  Continue home losartan  Continue home statin     #History of nicotine dependence  Continue Nicotine patch    #Diabetes   Hold home meds  Monitor AC&HS   Place on insulin sliding Scale,     DVT ppx Heparin Subq     Thank You for the Consult Will continue to follow

## 2024-06-04 NOTE — PROGRESS NOTE ADULT - SUBJECTIVE AND OBJECTIVE BOX
Subjective:    pat feeling much better, after acapilla device, still rib pain, no new complaint.    Home Medications:  atorvastatin 80 mg oral tablet: 1 tab(s) orally once a day (01 Jun 2024 22:48)  cholecalciferol 25 mcg (1000 intl units) oral tablet: 1 tab(s) orally once a day (01 Jun 2024 22:47)  CoQ10 300 mg oral capsule: 1 cap(s) orally once a day (01 Jun 2024 22:47)  Januvia 100 mg oral tablet: 1 tab(s) orally once a day (01 Jun 2024 22:47)  Jardiance 25 mg oral tablet: 1 tab(s) orally once a day (01 Jun 2024 22:47)  olmesartan 5 mg oral tablet: 2 tab(s) orally once a day (01 Jun 2024 22:47)    MEDICATIONS  (STANDING):  acetylcysteine 20%  Inhalation 4 milliLiter(s) Inhalation every 6 hours  albuterol/ipratropium for Nebulization 3 milliLiter(s) Nebulizer every 6 hours  atorvastatin 80 milliGRAM(s) Oral at bedtime  benzonatate 100 milliGRAM(s) Oral three times a day  cefepime  Injectable. 1000 milliGRAM(s) IV Push every 8 hours  cefepime  Injectable.      dextrose 10% Bolus 125 milliLiter(s) IV Bolus once  dextrose 5%. 1000 milliLiter(s) (100 mL/Hr) IV Continuous <Continuous>  dextrose 5%. 1000 milliLiter(s) (50 mL/Hr) IV Continuous <Continuous>  dextrose 50% Injectable 25 Gram(s) IV Push once  dextrose 50% Injectable 12.5 Gram(s) IV Push once  glucagon  Injectable 1 milliGRAM(s) IntraMuscular once  guaiFENesin ER 1200 milliGRAM(s) Oral every 12 hours  heparin   Injectable 5000 Unit(s) SubCutaneous every 8 hours  insulin glargine Injectable (LANTUS) 10 Unit(s) SubCutaneous every morning  insulin lispro (ADMELOG) corrective regimen sliding scale   SubCutaneous three times a day before meals  lidocaine   4% Patch 1 Patch Transdermal every 24 hours  lisinopril 40 milliGRAM(s) Oral daily  losartan 25 milliGRAM(s) Oral daily  nicotine -   7 mG/24Hr(s) Patch 1 Patch Transdermal daily  polyethylene glycol 3350 17 Gram(s) Oral daily  senna 2 Tablet(s) Oral at bedtime    MEDICATIONS  (PRN):  acetaminophen     Tablet .. 650 milliGRAM(s) Oral every 6 hours PRN Mild Pain (1 - 3)  cyclobenzaprine 10 milliGRAM(s) Oral three times a day PRN Muscle Spasm  dextrose Oral Gel 15 Gram(s) Oral once PRN Blood Glucose LESS THAN 70 milliGRAM(s)/deciliter  gabapentin 100 milliGRAM(s) Oral every 8 hours PRN nerve pain  HYDROmorphone  Injectable 0.5 milliGRAM(s) IV Push every 4 hours PRN Severe Pain (7 - 10)  ibuprofen  Tablet. 600 milliGRAM(s) Oral three times a day PRN Mild Pain (1 - 3)  traMADol 50 milliGRAM(s) Oral two times a day PRN Moderate Pain (4 - 6)      Allergies    No Known Drug Allergies  Originally Entered as [Severe Rash] reaction to [adhesive tape] (Unknown)    Intolerances        Vital Signs Last 24 Hrs  T(C): 36.9 (04 Jun 2024 15:19), Max: 36.9 (04 Jun 2024 15:19)  T(F): 98.5 (04 Jun 2024 15:19), Max: 98.5 (04 Jun 2024 15:19)  HR: 85 (04 Jun 2024 15:19) (79 - 91)  BP: 129/69 (04 Jun 2024 15:19) (129/69 - 146/76)  BP(mean): --  RR: 18 (04 Jun 2024 15:19) (18 - 18)  SpO2: 95% (04 Jun 2024 15:19) (94% - 99%)    Parameters below as of 04 Jun 2024 13:40  Patient On (Oxygen Delivery Method): nasal cannula, 3lpm          PHYSICAL EXAMINATION:    NECK:  Supple. No lymphadenopathy. Jugular venous pressure not elevated. Carotids equal.   HEART:   The cardiac impulse has a normal quality. Reg., Nl S1 and S2.  There are no murmurs, rubs or gallops noted  CHEST:  Chest crackles to auscultation. Normal respiratory effort.  ABDOMEN:  Soft and nontender.   EXTREMITIES:  There is no edema.       LABS:                        15.5   14.23 )-----------( 141      ( 04 Jun 2024 08:31 )             45.8     06-04    137  |  107  |  26<H>  ----------------------------<  160<H>  4.2   |  26  |  0.74    Ca    9.3      04 Jun 2024 08:31  Phos  3.5     06-04  Mg     2.0     06-04    TPro  6.4  /  Alb  3.0<L>  /  TBili  0.9  /  DBili  x   /  AST  25  /  ALT  50  /  AlkPhos  82  06-03      Urinalysis Basic - ( 04 Jun 2024 08:31 )    Color: x / Appearance: x / SG: x / pH: x  Gluc: 160 mg/dL / Ketone: x  / Bili: x / Urobili: x   Blood: x / Protein: x / Nitrite: x   Leuk Esterase: x / RBC: x / WBC x   Sq Epi: x / Non Sq Epi: x / Bacteria: x

## 2024-06-04 NOTE — PROGRESS NOTE ADULT - ASSESSMENT
PROBLEMS:    Left 6-9th rib fractures s/p mechanical fall 5/30/24  Possible pneumonia    PLAN:    Pulmonary better-ambulate  Iv cefepime as per ID  AEROSOLS  MUCOMYST/MUCINEX/acapalla valve  Incentive spirometry  Monitor pulse ox and HD status  GI/DVT prophylaxis  Pain control  DVT PROPHYLASIX

## 2024-06-04 NOTE — PROGRESS NOTE ADULT - SUBJECTIVE AND OBJECTIVE BOX
incomplete note    HOSPITALIST PROGRESS NOTE    CHIEF COMPLAINT: S/p mechanical fall, rib fx, pna    HISTORY OF THE PRESENT ILLNESS:  58 y/o male with PMHx of HTN, HLD, DM presented to the ED s/p mechanical fall while on vacation in [facility] on 5/30/24.  Pt states he slipped on wet surface near pool no head trauma and landed on left sided. Pt returned to  6/1/24 and presented to [facility] for evaluation of left rib/chest pain post fall.  Worse with inspiration. Pt denied headstrike or LOC. Pt denied any other complaints. Pt denied c/o fever, chills, abd pain, N/V/D, extremity pain or dysfunction, hemoptysis, hematemesis, hematuria, hematochexia, headache, diplopia, vertigo, dizzyness. Patient admitted to the trauma service hospitalist team consulted for pna and medical management     SUBJECTIVE / INTERVAL HPI: Patient seen and examined at bedside.       ROS: All 10 systems reviewed and found to be negative with the exception of what has been described above.     VITAL SIGNS:  Vital Signs Last 24 Hrs  T(C): 36.7 (04 Jun 2024 08:05), Max: 36.7 (04 Jun 2024 08:05)  T(F): 98 (04 Jun 2024 08:05), Max: 98 (04 Jun 2024 08:05)  HR: 79 (04 Jun 2024 13:40) (76 - 91)  BP: 129/71 (04 Jun 2024 08:05) (129/71 - 146/76)  BP(mean): --  RR: 18 (04 Jun 2024 08:05) (18 - 18)  SpO2: 94% (04 Jun 2024 08:05) (94% - 99%)    Parameters below as of 04 Jun 2024 13:40  Patient On (Oxygen Delivery Method): nasal cannula, 3lpm    PHYSICAL EXAM:  General: Well developed, well nourished, no acute distress  HEENT: NC/AT; PERRL, anicteric sclera; MMM  Neck: Supple  Cardiovascular: +S1/S2, RRR, no murmurs, rubs, gallops  Respiratory: CTA B/L; no W/R/R  Chest wall: +TTP to left 6-9th rib   Gastrointestinal: soft, NT/ND; +BSx4  Extremities: WWP; no edema, clubbing or cyanosis  Vascular: 2+ radial, DP/PT pulses B/L  Neurological: AAOx3; no focal deficits    MEDICATIONS:  MEDICATIONS  (STANDING):  acetylcysteine 20%  Inhalation 4 milliLiter(s) Inhalation every 6 hours  albuterol/ipratropium for Nebulization 3 milliLiter(s) Nebulizer every 6 hours  atorvastatin 80 milliGRAM(s) Oral at bedtime  benzonatate 100 milliGRAM(s) Oral three times a day  cefepime  Injectable. 1000 milliGRAM(s) IV Push every 8 hours  cefepime  Injectable.      dextrose 10% Bolus 125 milliLiter(s) IV Bolus once  dextrose 5%. 1000 milliLiter(s) (100 mL/Hr) IV Continuous <Continuous>  dextrose 5%. 1000 milliLiter(s) (50 mL/Hr) IV Continuous <Continuous>  dextrose 50% Injectable 25 Gram(s) IV Push once  dextrose 50% Injectable 12.5 Gram(s) IV Push once  glucagon  Injectable 1 milliGRAM(s) IntraMuscular once  guaiFENesin ER 1200 milliGRAM(s) Oral every 12 hours  heparin   Injectable 5000 Unit(s) SubCutaneous every 8 hours  insulin glargine Injectable (LANTUS) 10 Unit(s) SubCutaneous every morning  insulin lispro (ADMELOG) corrective regimen sliding scale   SubCutaneous three times a day before meals  lidocaine   4% Patch 1 Patch Transdermal every 24 hours  lisinopril 40 milliGRAM(s) Oral daily  losartan 25 milliGRAM(s) Oral daily  nicotine -   7 mG/24Hr(s) Patch 1 Patch Transdermal daily  polyethylene glycol 3350 17 Gram(s) Oral daily  senna 2 Tablet(s) Oral at bedtime    MEDICATIONS  (PRN):  acetaminophen     Tablet .. 650 milliGRAM(s) Oral every 6 hours PRN Mild Pain (1 - 3)  cyclobenzaprine 10 milliGRAM(s) Oral three times a day PRN Muscle Spasm  dextrose Oral Gel 15 Gram(s) Oral once PRN Blood Glucose LESS THAN 70 milliGRAM(s)/deciliter  gabapentin 100 milliGRAM(s) Oral every 8 hours PRN nerve pain  HYDROmorphone  Injectable 0.5 milliGRAM(s) IV Push every 4 hours PRN Severe Pain (7 - 10)  ibuprofen  Tablet. 600 milliGRAM(s) Oral three times a day PRN Mild Pain (1 - 3)  traMADol 50 milliGRAM(s) Oral two times a day PRN Moderate Pain (4 - 6)      ALLERGIES:  Allergies    No Known Drug Allergies  Originally Entered as [Severe Rash] reaction to [adhesive tape] (Unknown)    Intolerances        LABS:                        15.5   14.23 )-----------( 141      ( 04 Jun 2024 08:31 )             45.8     06-04    137  |  107  |  26<H>  ----------------------------<  160<H>  4.2   |  26  |  0.74    Ca    9.3      04 Jun 2024 08:31  Phos  3.5     06-04  Mg     2.0     06-04    TPro  6.4  /  Alb  3.0<L>  /  TBili  0.9  /  DBili  x   /  AST  25  /  ALT  50  /  AlkPhos  82  06-03      Urinalysis Basic - ( 04 Jun 2024 08:31 )    Color: x / Appearance: x / SG: x / pH: x  Gluc: 160 mg/dL / Ketone: x  / Bili: x / Urobili: x   Blood: x / Protein: x / Nitrite: x   Leuk Esterase: x / RBC: x / WBC x   Sq Epi: x / Non Sq Epi: x / Bacteria: x      CAPILLARY BLOOD GLUCOSE      POCT Blood Glucose.: 164 mg/dL (04 Jun 2024 11:40)        RADIOLOGY & ADDITIONAL TESTS: Reviewed. HOSPITALIST PROGRESS NOTE    CHIEF COMPLAINT: S/p mechanical fall, rib fx, pna    HISTORY OF THE PRESENT ILLNESS:  58 y/o male with PMHx of HTN, HLD, DM presented to the ED s/p mechanical fall while on vacation in [facility] on 5/30/24.  Pt states he slipped on wet surface near pool no head trauma and landed on left sided. Pt returned to  6/1/24 and presented to [facility] ED for evaluation of left rib/chest pain post fall.  Worse with inspiration. Pt denied headstrike or LOC. Pt denied any other complaints. Pt denied c/o fever, chills, abd pain, N/V/D, extremity pain or dysfunction, hemoptysis, hematemesis, hematuria, hematochexia, headache, diplopia, vertigo, dizzyness. Patient admitted to the trauma service hospitalist team consulted for pna and medical management     SUBJECTIVE / INTERVAL HPI: Patient seen and examined at bedside. Symptoms improving. Will titrate down on Dilaudid.     ROS: All 10 systems reviewed and found to be negative with the exception of what has been described above.     VITAL SIGNS:  Vital Signs Last 24 Hrs  T(C): 36.7 (04 Jun 2024 08:05), Max: 36.7 (04 Jun 2024 08:05)  T(F): 98 (04 Jun 2024 08:05), Max: 98 (04 Jun 2024 08:05)  HR: 79 (04 Jun 2024 13:40) (76 - 91)  BP: 129/71 (04 Jun 2024 08:05) (129/71 - 146/76)  BP(mean): --  RR: 18 (04 Jun 2024 08:05) (18 - 18)  SpO2: 94% (04 Jun 2024 08:05) (94% - 99%)    Parameters below as of 04 Jun 2024 13:40  Patient On (Oxygen Delivery Method): nasal cannula, 3lpm    PHYSICAL EXAM:  General: Well developed, well nourished, no acute distress  HEENT: NC/AT; PERRL, anicteric sclera; MMM  Neck: Supple  Cardiovascular: +S1/S2, RRR, no murmurs, rubs, gallops  Respiratory: CTA B/L; no W/R/R  Chest wall: +TTP to left 6-9th rib   Gastrointestinal: soft, NT/ND; +BSx4  Extremities: WWP; no edema, clubbing or cyanosis  Vascular: 2+ radial, DP/PT pulses B/L  Neurological: AAOx3; no focal deficits    MEDICATIONS:  MEDICATIONS  (STANDING):  acetylcysteine 20%  Inhalation 4 milliLiter(s) Inhalation every 6 hours  albuterol/ipratropium for Nebulization 3 milliLiter(s) Nebulizer every 6 hours  atorvastatin 80 milliGRAM(s) Oral at bedtime  benzonatate 100 milliGRAM(s) Oral three times a day  cefepime  Injectable. 1000 milliGRAM(s) IV Push every 8 hours  cefepime  Injectable.      dextrose 10% Bolus 125 milliLiter(s) IV Bolus once  dextrose 5%. 1000 milliLiter(s) (100 mL/Hr) IV Continuous <Continuous>  dextrose 5%. 1000 milliLiter(s) (50 mL/Hr) IV Continuous <Continuous>  dextrose 50% Injectable 25 Gram(s) IV Push once  dextrose 50% Injectable 12.5 Gram(s) IV Push once  glucagon  Injectable 1 milliGRAM(s) IntraMuscular once  guaiFENesin ER 1200 milliGRAM(s) Oral every 12 hours  heparin   Injectable 5000 Unit(s) SubCutaneous every 8 hours  insulin glargine Injectable (LANTUS) 10 Unit(s) SubCutaneous every morning  insulin lispro (ADMELOG) corrective regimen sliding scale   SubCutaneous three times a day before meals  lidocaine   4% Patch 1 Patch Transdermal every 24 hours  lisinopril 40 milliGRAM(s) Oral daily  losartan 25 milliGRAM(s) Oral daily  nicotine -   7 mG/24Hr(s) Patch 1 Patch Transdermal daily  polyethylene glycol 3350 17 Gram(s) Oral daily  senna 2 Tablet(s) Oral at bedtime    MEDICATIONS  (PRN):  acetaminophen     Tablet .. 650 milliGRAM(s) Oral every 6 hours PRN Mild Pain (1 - 3)  cyclobenzaprine 10 milliGRAM(s) Oral three times a day PRN Muscle Spasm  dextrose Oral Gel 15 Gram(s) Oral once PRN Blood Glucose LESS THAN 70 milliGRAM(s)/deciliter  gabapentin 100 milliGRAM(s) Oral every 8 hours PRN nerve pain  HYDROmorphone  Injectable 0.5 milliGRAM(s) IV Push every 4 hours PRN Severe Pain (7 - 10)  ibuprofen  Tablet. 600 milliGRAM(s) Oral three times a day PRN Mild Pain (1 - 3)  traMADol 50 milliGRAM(s) Oral two times a day PRN Moderate Pain (4 - 6)      ALLERGIES:  Allergies    No Known Drug Allergies  Originally Entered as [Severe Rash] reaction to [adhesive tape] (Unknown)    Intolerances        LABS:                        15.5   14.23 )-----------( 141      ( 04 Jun 2024 08:31 )             45.8     06-04    137  |  107  |  26<H>  ----------------------------<  160<H>  4.2   |  26  |  0.74    Ca    9.3      04 Jun 2024 08:31  Phos  3.5     06-04  Mg     2.0     06-04    TPro  6.4  /  Alb  3.0<L>  /  TBili  0.9  /  DBili  x   /  AST  25  /  ALT  50  /  AlkPhos  82  06-03      Urinalysis Basic - ( 04 Jun 2024 08:31 )    Color: x / Appearance: x / SG: x / pH: x  Gluc: 160 mg/dL / Ketone: x  / Bili: x / Urobili: x   Blood: x / Protein: x / Nitrite: x   Leuk Esterase: x / RBC: x / WBC x   Sq Epi: x / Non Sq Epi: x / Bacteria: x      CAPILLARY BLOOD GLUCOSE      POCT Blood Glucose.: 164 mg/dL (04 Jun 2024 11:40)        RADIOLOGY & ADDITIONAL TESTS: Reviewed.

## 2024-06-04 NOTE — PROGRESS NOTE ADULT - SUBJECTIVE AND OBJECTIVE BOX
CC:Patient is a 59y old  Male who presents with a chief complaint of multiple rib fractures (04 Jun 2024 16:18)    Overnight: No acute events.    Subjective:  Pt seen and examined at bedside. Pt is AAOx3, pt in no acute distress. Endorsing improved Lt chest wall pain. Pt denied c/o fever, chills, chest pain, SOB, abd pain, N/V/D, extremity pain or dysfunction, hemoptysis, hematemesis, hematuria, hematochezia headache, diplopia, vertigo, dizziness.    ROS:  otherwise as abovementioned ROS    Vital Signs Last 24 Hrs  T(C): 36.9 (04 Jun 2024 15:19), Max: 36.9 (04 Jun 2024 15:19)  T(F): 98.5 (04 Jun 2024 15:19), Max: 98.5 (04 Jun 2024 15:19)  HR: 85 (04 Jun 2024 15:19) (79 - 91)  BP: 129/69 (04 Jun 2024 15:19) (129/69 - 146/76)  BP(mean): --  RR: 18 (04 Jun 2024 15:19) (18 - 18)  SpO2: 95% (04 Jun 2024 15:19) (94% - 99%)    Parameters below as of 04 Jun 2024 13:40  Patient On (Oxygen Delivery Method): nasal cannula, 3lpm        Labs:                                15.5   14.23 )-----------( 141      ( 04 Jun 2024 08:31 )             45.8     CBC Full  -  ( 04 Jun 2024 08:31 )  WBC Count : 14.23 K/uL  RBC Count : 4.61 M/uL  Hemoglobin : 15.5 g/dL  Hematocrit : 45.8 %  Platelet Count - Automated : 141 K/uL  Mean Cell Volume : 99.3 fl  Mean Cell Hemoglobin : 33.6 pg  Mean Cell Hemoglobin Concentration : 33.8 gm/dL  Auto Neutrophil # : x  Auto Lymphocyte # : x  Auto Monocyte # : x  Auto Eosinophil # : x  Auto Basophil # : x  Auto Neutrophil % : x  Auto Lymphocyte % : x  Auto Monocyte % : x  Auto Eosinophil % : x  Auto Basophil % : x    06-04    137  |  107  |  26<H>  ----------------------------<  160<H>  4.2   |  26  |  0.74    Ca    9.3      04 Jun 2024 08:31  Phos  3.5     06-04  Mg     2.0     06-04    TPro  6.4  /  Alb  3.0<L>  /  TBili  0.9  /  DBili  x   /  AST  25  /  ALT  50  /  AlkPhos  82  06-03    LIVER FUNCTIONS - ( 03 Jun 2024 07:13 )  Alb: 3.0 g/dL / Pro: 6.4 gm/dL / ALK PHOS: 82 U/L / ALT: 50 U/L / AST: 25 U/L / GGT: x                 Meds:  acetaminophen     Tablet .. 650 milliGRAM(s) Oral every 6 hours PRN  acetylcysteine 20%  Inhalation 4 milliLiter(s) Inhalation every 6 hours  albuterol/ipratropium for Nebulization 3 milliLiter(s) Nebulizer every 6 hours  atorvastatin 80 milliGRAM(s) Oral at bedtime  benzonatate 100 milliGRAM(s) Oral three times a day  cefepime  Injectable. 1000 milliGRAM(s) IV Push every 8 hours  cefepime  Injectable.      cyclobenzaprine 10 milliGRAM(s) Oral three times a day PRN  dextrose 10% Bolus 125 milliLiter(s) IV Bolus once  dextrose 5%. 1000 milliLiter(s) IV Continuous <Continuous>  dextrose 5%. 1000 milliLiter(s) IV Continuous <Continuous>  dextrose 50% Injectable 25 Gram(s) IV Push once  dextrose 50% Injectable 12.5 Gram(s) IV Push once  dextrose Oral Gel 15 Gram(s) Oral once PRN  gabapentin 100 milliGRAM(s) Oral every 8 hours PRN  glucagon  Injectable 1 milliGRAM(s) IntraMuscular once  guaiFENesin ER 1200 milliGRAM(s) Oral every 12 hours  heparin   Injectable 5000 Unit(s) SubCutaneous every 8 hours  HYDROmorphone  Injectable 0.5 milliGRAM(s) IV Push every 4 hours PRN  ibuprofen  Tablet. 600 milliGRAM(s) Oral three times a day PRN  insulin glargine Injectable (LANTUS) 10 Unit(s) SubCutaneous every morning  insulin lispro (ADMELOG) corrective regimen sliding scale   SubCutaneous three times a day before meals  lidocaine   4% Patch 1 Patch Transdermal every 24 hours  lisinopril 40 milliGRAM(s) Oral daily  losartan 25 milliGRAM(s) Oral daily  nicotine -   7 mG/24Hr(s) Patch 1 Patch Transdermal daily  polyethylene glycol 3350 17 Gram(s) Oral daily  senna 2 Tablet(s) Oral at bedtime  traMADol 50 milliGRAM(s) Oral two times a day PRN      Radiology:      Physical exam:  GCS of 15  Pt is aaox3  Pt in no acute distress  Airway is patent  Breathing is symmetric and unlabored  Neuro: CN II-XII grossly intact  Psych: normal affect  HEENT: normocephalic, VANESSA, EOM wnl, no gross craniofacial bony pathology to exam  Neck: No tracheal deviation, no JVD, no crepitus, no ecchymosis, no hematoma  Chest: TTP over Lt chest wall. No gross rib or sternal pathology or tenderness to exam, no crepitus, no ecchymosis, no hematoma  Resp: Chest crackles to auscultation b/l.  CVS: S1S2(+)  ABD: bowel sounds (+), soft, nontender, non distended, no rebound, no guarding, no rigidity, no pelvic instability to exam  EXT: no calf tenderness or edema to exam b/l, pt has good capillary refill in all digits. Sensoromotor function grossly intact, on VTE prophylaxis  Skin: no adverse skin changes to exam

## 2024-06-05 ENCOUNTER — TRANSCRIPTION ENCOUNTER (OUTPATIENT)
Age: 59
End: 2024-06-05

## 2024-06-05 VITALS
RESPIRATION RATE: 18 BRPM | DIASTOLIC BLOOD PRESSURE: 56 MMHG | OXYGEN SATURATION: 94 % | TEMPERATURE: 98 F | SYSTOLIC BLOOD PRESSURE: 150 MMHG | HEART RATE: 85 BPM

## 2024-06-05 LAB
ANION GAP SERPL CALC-SCNC: 7 MMOL/L — SIGNIFICANT CHANGE UP (ref 5–17)
BUN SERPL-MCNC: 36 MG/DL — HIGH (ref 7–23)
CALCIUM SERPL-MCNC: 9.9 MG/DL — SIGNIFICANT CHANGE UP (ref 8.5–10.1)
CHLORIDE SERPL-SCNC: 103 MMOL/L — SIGNIFICANT CHANGE UP (ref 96–108)
CO2 SERPL-SCNC: 26 MMOL/L — SIGNIFICANT CHANGE UP (ref 22–31)
CREAT SERPL-MCNC: 1.03 MG/DL — SIGNIFICANT CHANGE UP (ref 0.5–1.3)
EGFR: 83 ML/MIN/1.73M2 — SIGNIFICANT CHANGE UP
GLUCOSE BLDC GLUCOMTR-MCNC: 119 MG/DL — HIGH (ref 70–99)
GLUCOSE BLDC GLUCOMTR-MCNC: 247 MG/DL — HIGH (ref 70–99)
GLUCOSE SERPL-MCNC: 211 MG/DL — HIGH (ref 70–99)
HCT VFR BLD CALC: 48.8 % — SIGNIFICANT CHANGE UP (ref 39–50)
HGB BLD-MCNC: 16.4 G/DL — SIGNIFICANT CHANGE UP (ref 13–17)
MAGNESIUM SERPL-MCNC: 2.1 MG/DL — SIGNIFICANT CHANGE UP (ref 1.6–2.6)
MCHC RBC-ENTMCNC: 33.1 PG — SIGNIFICANT CHANGE UP (ref 27–34)
MCHC RBC-ENTMCNC: 33.6 GM/DL — SIGNIFICANT CHANGE UP (ref 32–36)
MCV RBC AUTO: 98.6 FL — SIGNIFICANT CHANGE UP (ref 80–100)
PHOSPHATE SERPL-MCNC: 4.1 MG/DL — SIGNIFICANT CHANGE UP (ref 2.5–4.5)
PLATELET # BLD AUTO: 177 K/UL — SIGNIFICANT CHANGE UP (ref 150–400)
POTASSIUM SERPL-MCNC: 4.4 MMOL/L — SIGNIFICANT CHANGE UP (ref 3.5–5.3)
POTASSIUM SERPL-SCNC: 4.4 MMOL/L — SIGNIFICANT CHANGE UP (ref 3.5–5.3)
RBC # BLD: 4.95 M/UL — SIGNIFICANT CHANGE UP (ref 4.2–5.8)
RBC # FLD: 14.4 % — SIGNIFICANT CHANGE UP (ref 10.3–14.5)
SODIUM SERPL-SCNC: 136 MMOL/L — SIGNIFICANT CHANGE UP (ref 135–145)
WBC # BLD: 12.79 K/UL — HIGH (ref 3.8–10.5)
WBC # FLD AUTO: 12.79 K/UL — HIGH (ref 3.8–10.5)

## 2024-06-05 PROCEDURE — 99239 HOSP IP/OBS DSCHRG MGMT >30: CPT

## 2024-06-05 PROCEDURE — 71045 X-RAY EXAM CHEST 1 VIEW: CPT | Mod: 26

## 2024-06-05 RX ORDER — OXYCODONE AND ACETAMINOPHEN 5; 325 MG/1; MG/1
1 TABLET ORAL
Qty: 16 | Refills: 0
Start: 2024-06-05 | End: 2024-06-08

## 2024-06-05 RX ORDER — CEFUROXIME AXETIL 250 MG
500 TABLET ORAL EVERY 12 HOURS
Refills: 0 | Status: DISCONTINUED | OUTPATIENT
Start: 2024-06-05 | End: 2024-06-05

## 2024-06-05 RX ORDER — INSULIN LISPRO 100/ML
VIAL (ML) SUBCUTANEOUS
Refills: 0 | Status: DISCONTINUED | OUTPATIENT
Start: 2024-06-05 | End: 2024-06-05

## 2024-06-05 RX ORDER — CEFUROXIME AXETIL 250 MG
1 TABLET ORAL
Qty: 14 | Refills: 0
Start: 2024-06-05 | End: 2024-06-11

## 2024-06-05 RX ADMIN — HEPARIN SODIUM 5000 UNIT(S): 5000 INJECTION INTRAVENOUS; SUBCUTANEOUS at 06:54

## 2024-06-05 RX ADMIN — HEPARIN SODIUM 5000 UNIT(S): 5000 INJECTION INTRAVENOUS; SUBCUTANEOUS at 13:56

## 2024-06-05 RX ADMIN — LIDOCAINE 1 PATCH: 4 CREAM TOPICAL at 10:47

## 2024-06-05 RX ADMIN — Medication 2: at 08:06

## 2024-06-05 RX ADMIN — HYDROMORPHONE HYDROCHLORIDE 0.5 MILLIGRAM(S): 2 INJECTION INTRAMUSCULAR; INTRAVENOUS; SUBCUTANEOUS at 16:43

## 2024-06-05 RX ADMIN — INSULIN GLARGINE 10 UNIT(S): 100 INJECTION, SOLUTION SUBCUTANEOUS at 08:07

## 2024-06-05 RX ADMIN — Medication 4 MILLILITER(S): at 09:39

## 2024-06-05 RX ADMIN — Medication 100 MILLIGRAM(S): at 06:54

## 2024-06-05 RX ADMIN — LIDOCAINE 1 PATCH: 4 CREAM TOPICAL at 10:46

## 2024-06-05 RX ADMIN — CEFEPIME 1000 MILLIGRAM(S): 1 INJECTION, POWDER, FOR SOLUTION INTRAMUSCULAR; INTRAVENOUS at 06:54

## 2024-06-05 RX ADMIN — Medication 1200 MILLIGRAM(S): at 09:44

## 2024-06-05 RX ADMIN — Medication 1 PATCH: at 09:43

## 2024-06-05 RX ADMIN — Medication 3 MILLILITER(S): at 09:38

## 2024-06-05 RX ADMIN — LOSARTAN POTASSIUM 25 MILLIGRAM(S): 100 TABLET, FILM COATED ORAL at 09:43

## 2024-06-05 RX ADMIN — HYDROMORPHONE HYDROCHLORIDE 0.5 MILLIGRAM(S): 2 INJECTION INTRAMUSCULAR; INTRAVENOUS; SUBCUTANEOUS at 09:43

## 2024-06-05 RX ADMIN — Medication 1 PATCH: at 09:44

## 2024-06-05 RX ADMIN — Medication 100 MILLIGRAM(S): at 13:56

## 2024-06-05 RX ADMIN — Medication 1 PATCH: at 10:46

## 2024-06-05 NOTE — PROGRESS NOTE ADULT - SUBJECTIVE AND OBJECTIVE BOX
Subjective:    pat better, sitting in bed, c/o rib pain, no respiratory distress.    Home Medications:  atorvastatin 80 mg oral tablet: 1 tab(s) orally once a day (01 Jun 2024 22:48)  cholecalciferol 25 mcg (1000 intl units) oral tablet: 1 tab(s) orally once a day (01 Jun 2024 22:47)  CoQ10 300 mg oral capsule: 1 cap(s) orally once a day (01 Jun 2024 22:47)  Januvia 100 mg oral tablet: 1 tab(s) orally once a day (01 Jun 2024 22:47)  Jardiance 25 mg oral tablet: 1 tab(s) orally once a day (01 Jun 2024 22:47)  olmesartan 5 mg oral tablet: 2 tab(s) orally once a day (01 Jun 2024 22:47)    MEDICATIONS  (STANDING):  acetylcysteine 20%  Inhalation 4 milliLiter(s) Inhalation every 6 hours  albuterol/ipratropium for Nebulization 3 milliLiter(s) Nebulizer every 6 hours  atorvastatin 80 milliGRAM(s) Oral at bedtime  benzonatate 100 milliGRAM(s) Oral three times a day  cefuroxime   Tablet 500 milliGRAM(s) Oral every 12 hours  dextrose 10% Bolus 125 milliLiter(s) IV Bolus once  dextrose 5%. 1000 milliLiter(s) (50 mL/Hr) IV Continuous <Continuous>  dextrose 5%. 1000 milliLiter(s) (100 mL/Hr) IV Continuous <Continuous>  dextrose 50% Injectable 25 Gram(s) IV Push once  dextrose 50% Injectable 12.5 Gram(s) IV Push once  glucagon  Injectable 1 milliGRAM(s) IntraMuscular once  guaiFENesin ER 1200 milliGRAM(s) Oral every 12 hours  heparin   Injectable 5000 Unit(s) SubCutaneous every 8 hours  insulin glargine Injectable (LANTUS) 10 Unit(s) SubCutaneous every morning  insulin lispro (ADMELOG) corrective regimen sliding scale   SubCutaneous Before meals and at bedtime  lidocaine   4% Patch 1 Patch Transdermal every 24 hours  losartan 25 milliGRAM(s) Oral daily  nicotine -   7 mG/24Hr(s) Patch 1 Patch Transdermal daily  polyethylene glycol 3350 17 Gram(s) Oral daily  senna 2 Tablet(s) Oral at bedtime    MEDICATIONS  (PRN):  acetaminophen     Tablet .. 650 milliGRAM(s) Oral every 6 hours PRN Mild Pain (1 - 3)  cyclobenzaprine 10 milliGRAM(s) Oral three times a day PRN Muscle Spasm  dextrose Oral Gel 15 Gram(s) Oral once PRN Blood Glucose LESS THAN 70 milliGRAM(s)/deciliter  gabapentin 100 milliGRAM(s) Oral every 8 hours PRN nerve pain  HYDROmorphone  Injectable 0.5 milliGRAM(s) IV Push every 4 hours PRN Severe Pain (7 - 10)  ibuprofen  Tablet. 600 milliGRAM(s) Oral three times a day PRN Mild Pain (1 - 3)  traMADol 50 milliGRAM(s) Oral two times a day PRN Moderate Pain (4 - 6)      Allergies    No Known Drug Allergies  Originally Entered as [Severe Rash] reaction to [adhesive tape] (Unknown)    Intolerances        Vital Signs Last 24 Hrs  T(C): 36.7 (05 Jun 2024 15:22), Max: 37.1 (05 Jun 2024 08:32)  T(F): 98 (05 Jun 2024 15:22), Max: 98.7 (05 Jun 2024 08:32)  HR: 85 (05 Jun 2024 15:22) (84 - 92)  BP: 150/56 (05 Jun 2024 15:22) (122/64 - 155/85)  BP(mean): --  RR: 18 (05 Jun 2024 15:22) (18 - 18)  SpO2: 94% (05 Jun 2024 15:22) (92% - 100%)    Parameters below as of 05 Jun 2024 15:22  Patient On (Oxygen Delivery Method): room air          PHYSICAL EXAMINATION:    NECK:  Supple. No lymphadenopathy. Jugular venous pressure not elevated. Carotids equal.   HEART:   The cardiac impulse has a normal quality. Reg., Nl S1 and S2.  There are no murmurs, rubs or gallops noted  CHEST:  Chest rhonchi to auscultation. Normal respiratory effort.  ABDOMEN:  Soft and nontender.   EXTREMITIES:  There is no edema.       LABS:                        16.4   12.79 )-----------( 177      ( 05 Jun 2024 07:44 )             48.8     06-05    136  |  103  |  36<H>  ----------------------------<  211<H>  4.4   |  26  |  1.03    Ca    9.9      05 Jun 2024 07:44  Phos  4.1     06-05  Mg     2.1     06-05        Urinalysis Basic - ( 05 Jun 2024 07:44 )    Color: x / Appearance: x / SG: x / pH: x  Gluc: 211 mg/dL / Ketone: x  / Bili: x / Urobili: x   Blood: x / Protein: x / Nitrite: x   Leuk Esterase: x / RBC: x / WBC x   Sq Epi: x / Non Sq Epi: x / Bacteria: x

## 2024-06-05 NOTE — DISCHARGE NOTE PROVIDER - NSDCMRMEDTOKEN_GEN_ALL_CORE_FT
atorvastatin 80 mg oral tablet: 1 tab(s) orally once a day  cefuroxime 500 mg oral tablet: 1 tab(s) orally every 12 hours MDD: 2 tabs  cholecalciferol 25 mcg (1000 intl units) oral tablet: 1 tab(s) orally once a day  CoQ10 300 mg oral capsule: 1 cap(s) orally once a day  Januvia 100 mg oral tablet: 1 tab(s) orally once a day  Jardiance 25 mg oral tablet: 1 tab(s) orally once a day  olmesartan 5 mg oral tablet: 2 tab(s) orally once a day  oxycodone-acetaminophen 5 mg-325 mg oral tablet: 1 tab(s) orally every 6 hours MDD: 4 tabs

## 2024-06-05 NOTE — DISCHARGE NOTE PROVIDER - CARE PROVIDERS DIRECT ADDRESSES
,[email or web address]

## 2024-06-05 NOTE — DISCHARGE NOTE PROVIDER - CARE PROVIDER_API CALL
Kisha Celaya [facility]  Infectious Disease  [address], [address] [address]  Phone: [phone]  Fax: [phone]  Follow Up Time:     Maximus Caballero (DO)  Surgery  [address] [place] [address]  Phone: [phone]  Fax: [phone]  Follow Up Time:

## 2024-06-05 NOTE — DISCHARGE NOTE NURSING/CASE MANAGEMENT/SOCIAL WORK - PATIENT PORTAL LINK FT
You can access the FollowMyHealth Patient Portal offered by [facility] by registering at the following website: [email or web address]. By joining [employer]’s FollowMyHealth portal, you will also be able to view your health information using other applications (apps) compatible with our system.

## 2024-06-05 NOTE — DISCHARGE NOTE PROVIDER - NSDCHHENCOUNTER_GEN_ALL_CORE
05-Jun-2024 [facility] Counseling:  I discussed with the patient the risks of ustekinumab including but not limited to immunosuppression, malignancy, posterior leukoencephalopathy syndrome, and serious infections.  The patient understands that monitoring is required including a PPD at baseline and must alert us or the primary physician if symptoms of infection or other concerning signs are noted.

## 2024-06-05 NOTE — PROGRESS NOTE ADULT - REASON FOR ADMISSION
multiple rib fractures

## 2024-06-05 NOTE — DISCHARGE NOTE PROVIDER - NSDCFUADDAPPT_GEN_ALL_CORE_FT
Please contact your primary care provider to make appointment.  -Post multiple rib fractures   -Incidental CT findings  Enlarged prostate, correlate with PSA level.  1.7 cm left renal hyperdense lesion, could be a hyperdense cyst.    Please contact infectious disease office to make a follow up appointment.  -Post pneumonia treatment    Please contact your endocrinologist office to make follow up appointment.  -Monitor and adjust DM medication mgmt

## 2024-06-05 NOTE — DISCHARGE NOTE NURSING/CASE MANAGEMENT/SOCIAL WORK - NSDCPEFALRISK_GEN_ALL_CORE
For information on Fall & Injury Prevention, visit: [email or web address] OR  [email or web address] OR  https://www.cdc.gov/steadi/patient.html

## 2024-06-05 NOTE — DISCHARGE NOTE PROVIDER - HOSPITAL COURSE
60y/o M with PMHx of HTN, HLD, DM who presented to  ED on 6/1/2024 with CC of left sided rib pain and difficulty breathing s/p fall in [facility] on Thursday (5/30/2024). Pt states he slipped on wet surface near pool and landed on left sided. No HS or LOC. Pt states pain gradually worsened with deep breath. CT imaging demonstrating Left 6-9th rib fractures and suspected b/l lower lobe PNA. Admission labs noted for leukocytosis. Hospital course: ID & Pulmonary consult ([employer] mgmt of suspected CAP, treated with empiric ABX therapy) and hyperglycemia requiring basilar/SS coverage. Pt currently in NAD and without acute complaints. Denies N/V/D HA, dizziness, blurry vision, numbness/tingling, SOB, cough, chest pain, palpitations, abd pain or urinary sx's. Pt seen and evaluated by surgical attending today and deemed clear for d/c home.     Incidental CT findings:  Enlarged prostate, correlate with PSA level.  1.7 cm left renal hyperdense lesion, could be a hyperdense cyst.

## 2024-06-05 NOTE — DISCHARGE NOTE NURSING/CASE MANAGEMENT/SOCIAL WORK - NSTOBACCOREFERRAL_GEN_A_CS
Called and left a voicemail message informing the patient that her goal QuantiFERON test came back normal [negative]. She was also invited to  a paper copy of this report.   Patient declined information

## 2024-06-05 NOTE — DISCHARGE NOTE PROVIDER - DETAILS OF MALNUTRITION DIAGNOSIS/DIAGNOSES
This patient has been assessed with a concern for Malnutrition and was treated during this hospitalization for the following Nutrition diagnosis/diagnoses:     -  06/04/2024: Severe protein-calorie malnutrition

## 2024-06-05 NOTE — PROGRESS NOTE ADULT - PROVIDER SPECIALTY LIST ADULT
Hospitalist
Infectious Disease
Pulmonology
Pulmonology
Hospitalist
Trauma Surgery
Hospitalist
Pulmonology
Trauma Surgery
Trauma Surgery

## 2024-06-05 NOTE — PROGRESS NOTE ADULT - SUBJECTIVE AND OBJECTIVE BOX
HOSPITALIST PROGRESS NOTE    CHIEF COMPLAINT: S/p mechanical fall, rib fx, pna    HISTORY OF THE PRESENT ILLNESS:  58 y/o male with PMHx of HTN, HLD, DM presented to the ED s/p mechanical fall while on vacation in [facility] on 5/30/24.  Pt states he slipped on wet surface near pool no head trauma and landed on left sided. Pt returned to  6/1/24 and presented to [place] ED for evaluation of left rib/chest pain post fall.  Worse with inspiration. Pt denied headstrike or LOC. Pt denied any other complaints. Pt denied c/o fever, chills, abd pain, N/V/D, extremity pain or dysfunction, hemoptysis, hematemesis, hematuria, hematochexia, headache, diplopia, vertigo, dizzyness. Patient admitted to the trauma service hospitalist team consulted for pna and medical management     SUBJECTIVE / INTERVAL HPI: Patient seen and examined at bedside. Patient feels much better. Breathing improved significantly. Ambulating on room air without issues. Pain controlled.     ROS: All 10 systems reviewed and found to be negative with the exception of what has been described above.     VITAL SIGNS:  Vital Signs Last 24 Hrs  T(C): 37.1 (05 Jun 2024 08:32), Max: 37.1 (05 Jun 2024 08:32)  T(F): 98.7 (05 Jun 2024 08:32), Max: 98.7 (05 Jun 2024 08:32)  HR: 87 (05 Jun 2024 10:17) (84 - 92)  BP: 155/85 (05 Jun 2024 08:32) (122/64 - 155/85)  BP(mean): --  RR: 18 (05 Jun 2024 08:32) (18 - 18)  SpO2: 96% (05 Jun 2024 10:17) (92% - 100%)    Parameters below as of 05 Jun 2024 10:17  Patient On (Oxygen Delivery Method): room air    PHYSICAL EXAM:  General: Well developed, well nourished, no acute distress  HEENT: NC/AT; PERRL, anicteric sclera; MMM  Neck: Supple  Cardiovascular: +S1/S2, RRR, no murmurs, rubs, gallops  Respiratory: CTA B/L; no W/R/R  Chest wall: +TTP to left 6-9th rib   Gastrointestinal: soft, NT/ND; +BSx4  Extremities: WWP; no edema, clubbing or cyanosis  Vascular: 2+ radial, DP/PT pulses B/L  Neurological: AAOx3; no focal deficits    MEDICATIONS:  MEDICATIONS  (STANDING):  acetylcysteine 20%  Inhalation 4 milliLiter(s) Inhalation every 6 hours  albuterol/ipratropium for Nebulization 3 milliLiter(s) Nebulizer every 6 hours  atorvastatin 80 milliGRAM(s) Oral at bedtime  benzonatate 100 milliGRAM(s) Oral three times a day  cefuroxime   Tablet 500 milliGRAM(s) Oral every 12 hours  dextrose 10% Bolus 125 milliLiter(s) IV Bolus once  dextrose 5%. 1000 milliLiter(s) (50 mL/Hr) IV Continuous <Continuous>  dextrose 5%. 1000 milliLiter(s) (100 mL/Hr) IV Continuous <Continuous>  dextrose 50% Injectable 12.5 Gram(s) IV Push once  dextrose 50% Injectable 25 Gram(s) IV Push once  glucagon  Injectable 1 milliGRAM(s) IntraMuscular once  guaiFENesin ER 1200 milliGRAM(s) Oral every 12 hours  heparin   Injectable 5000 Unit(s) SubCutaneous every 8 hours  insulin glargine Injectable (LANTUS) 10 Unit(s) SubCutaneous every morning  insulin lispro (ADMELOG) corrective regimen sliding scale   SubCutaneous Before meals and at bedtime  lidocaine   4% Patch 1 Patch Transdermal every 24 hours  losartan 25 milliGRAM(s) Oral daily  nicotine -   7 mG/24Hr(s) Patch 1 Patch Transdermal daily  polyethylene glycol 3350 17 Gram(s) Oral daily  senna 2 Tablet(s) Oral at bedtime    MEDICATIONS  (PRN):  acetaminophen     Tablet .. 650 milliGRAM(s) Oral every 6 hours PRN Mild Pain (1 - 3)  cyclobenzaprine 10 milliGRAM(s) Oral three times a day PRN Muscle Spasm  dextrose Oral Gel 15 Gram(s) Oral once PRN Blood Glucose LESS THAN 70 milliGRAM(s)/deciliter  gabapentin 100 milliGRAM(s) Oral every 8 hours PRN nerve pain  HYDROmorphone  Injectable 0.5 milliGRAM(s) IV Push every 4 hours PRN Severe Pain (7 - 10)  ibuprofen  Tablet. 600 milliGRAM(s) Oral three times a day PRN Mild Pain (1 - 3)  traMADol 50 milliGRAM(s) Oral two times a day PRN Moderate Pain (4 - 6)      ALLERGIES:  Allergies    No Known Drug Allergies  Originally Entered as [Severe Rash] reaction to [adhesive tape] (Unknown)    Intolerances        LABS:                        16.4   12.79 )-----------( 177      ( 05 Jun 2024 07:44 )             48.8     06-05    136  |  103  |  36<H>  ----------------------------<  211<H>  4.4   |  26  |  1.03    Ca    9.9      05 Jun 2024 07:44  Phos  4.1     06-05  Mg     2.1     06-05        Urinalysis Basic - ( 05 Jun 2024 07:44 )    Color: x / Appearance: x / SG: x / pH: x  Gluc: 211 mg/dL / Ketone: x  / Bili: x / Urobili: x   Blood: x / Protein: x / Nitrite: x   Leuk Esterase: x / RBC: x / WBC x   Sq Epi: x / Non Sq Epi: x / Bacteria: x      CAPILLARY BLOOD GLUCOSE      POCT Blood Glucose.: 119 mg/dL (05 Jun 2024 12:05)        RADIOLOGY & ADDITIONAL TESTS: Reviewed.

## 2024-06-05 NOTE — PROGRESS NOTE ADULT - ASSESSMENT
60 y/o male with PMHx of HTN, HLD, DM presents to the ED c/o left sided rib pain and difficulty breathing [facility]. Pt states he slipped on wet surface near pool no head trauma and landed on left sided. Pt states pain gradually worse and worse with deep breath. Pt has been ambulating since then. Pt found to be hypoxic in ED., no leg pain, no chest pain, no numbness/tingling/weakness of extremities, no abdominal pain, no N/V/D      #Left 6-9th rib fractures s/p mechanical fall 5/30/24  #Left pleural effusion  Trauma team primary  Multimodal pain control   Decrease Dilaudid 1 mg q4 to 0.5 q4 with plan to transition to oral on d/c. Pain is in control  moderate - tramadol 50mg BID  Mild - Tylenol/ibuprofen  Increased flexeril to 10mg TID for spasm  Lasix 40 IVP x1 given 6/4. Breathing stable  Continue gabapentin 100 q8   Continue lidocaine patch   Continue pulmonary toileting   Repeat CXR with Dr. Scherer in office in 1 week (discussed with patient - understands)    #Community Acquired Pneumonia secondary to Rib fracture   Duoneb nebs  Weaned off O2  Abx switched to Cefepime per ID during hospital stay. Now clinically improving. No fevers, WBC downtrend, weaned off oxygen. Transition to Ceftin for 7 days per ID  Incentive spirometry (will take home on d/c)  Add mucinex and tessalon   ID consulted recommendations appreciated  Patient with amoxicillin allergy    #Hypertension/Hyperlipidemia  Continue home losartan  Continue home statin     #History of nicotine dependence  Continue Nicotine patch    #Diabetes   Monitor AC&HS   Place on insulin sliding Scale  Resume home medications on d/c    DVT ppx Heparin Subq     Plan for discharge today. Discharge instructions reviewed at length with patient at bedside.     Thank You for the Consult

## 2024-06-05 NOTE — DISCHARGE NOTE PROVIDER - NSDCHHCONTACT_GEN_ALL_CORE_FT
Levi Lr(Attending)
As certified below, I, or a nurse practitioner or physician assistant working with me, had a face-to-face encounter that meets the physician face-to-face encounter requirements.

## 2024-06-07 LAB
CULTURE RESULTS: SIGNIFICANT CHANGE UP
CULTURE RESULTS: SIGNIFICANT CHANGE UP
SPECIMEN SOURCE: SIGNIFICANT CHANGE UP
SPECIMEN SOURCE: SIGNIFICANT CHANGE UP

## 2024-06-13 DIAGNOSIS — F17.290 NICOTINE DEPENDENCE, OTHER TOBACCO PRODUCT, UNCOMPLICATED: ICD-10-CM

## 2024-06-13 DIAGNOSIS — E11.65 TYPE 2 DIABETES MELLITUS WITH HYPERGLYCEMIA: ICD-10-CM

## 2024-06-13 DIAGNOSIS — J18.9 PNEUMONIA, UNSPECIFIED ORGANISM: ICD-10-CM

## 2024-06-13 DIAGNOSIS — F12.90 CANNABIS USE, UNSPECIFIED, UNCOMPLICATED: ICD-10-CM

## 2024-06-13 DIAGNOSIS — S22.42XA MULTIPLE FRACTURES OF RIBS, LEFT SIDE, INITIAL ENCOUNTER FOR CLOSED FRACTURE: ICD-10-CM

## 2024-06-13 DIAGNOSIS — J90 PLEURAL EFFUSION, NOT ELSEWHERE CLASSIFIED: ICD-10-CM

## 2024-06-13 DIAGNOSIS — J18.8 OTHER PNEUMONIA, UNSPECIFIED ORGANISM: ICD-10-CM
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
## 2024-06-13 DIAGNOSIS — I10 ESSENTIAL (PRIMARY) HYPERTENSION: ICD-10-CM

## 2024-06-13 DIAGNOSIS — N28.1 CYST OF KIDNEY, ACQUIRED: ICD-10-CM
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
## 2024-06-13 DIAGNOSIS — E78.5 HYPERLIPIDEMIA, UNSPECIFIED: ICD-10-CM

## 2024-06-13 DIAGNOSIS — F10.90 ALCOHOL USE, UNSPECIFIED, UNCOMPLICATED: ICD-10-CM
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
## 2024-06-13 DIAGNOSIS — E43 UNSPECIFIED SEVERE PROTEIN-CALORIE MALNUTRITION: ICD-10-CM

## 2024-06-13 DIAGNOSIS — N40.0 BENIGN PROSTATIC HYPERPLASIA WITHOUT LOWER URINARY TRACT SYMPTOMS: ICD-10-CM

## 2024-06-13 DIAGNOSIS — R09.02 HYPOXEMIA: ICD-10-CM

## 2024-06-13 DIAGNOSIS — Z79.84 LONG TERM (CURRENT) USE OF ORAL HYPOGLYCEMIC DRUGS: ICD-10-CM

## 2024-06-13 DIAGNOSIS — Z79.899 OTHER LONG TERM (CURRENT) DRUG THERAPY: ICD-10-CM

## 2024-06-13 DIAGNOSIS — Z90.49 ACQUIRED ABSENCE OF OTHER SPECIFIED PARTS OF DIGESTIVE TRACT: ICD-10-CM

## 2024-08-08 NOTE — ED STATDOCS - CPE ED EYE NORM
[de-identified] : Patient allowed to gently start resuming activities. Discussed change to medication prescription and usage. Offered cortisone steroid injection.for pain control Bracing options discussed with patient. Hyaluronic Acid inj pamphlet given to pt. try topical lidocaine for pain reviewed current medications being used by this patient Home exercise for functional improvement  poss TKA 08/08/2024    RE:  CHIDI WELLS   [id number]    Attention:  Nurse Reviewer /Medical Director  I am writing this letter as a medical necessity for PT program. Patient has tried analgesics, non-steroid anti-inflammatory agents,  hot or cold compresses,injections of corticosteroids, etc)  which in combination or by themselves has not worked. Based on my patient's condition, I strongly believe that the PT is medically needed.   Thank you for your time and consideration.    
bilateral normal...

## 2024-08-09 ENCOUNTER — APPOINTMENT (OUTPATIENT)
Dept: VASCULAR SURGERY | Facility: CLINIC | Age: 59
End: 2024-08-09

## 2024-08-09 PROCEDURE — 99203 OFFICE O/P NEW LOW 30 MIN: CPT

## 2024-08-09 PROCEDURE — 93971 EXTREMITY STUDY: CPT | Mod: LT

## 2024-08-16 NOTE — HISTORY OF PRESENT ILLNESS
[FreeTextEntry1] : 59M who presents for initial evaluation of left leg pain for the past 24 months.  Leg discomfort is associated with leg swelling, fatigue, heaviness, achiness, restlessness, muscle cramping, itchiness and dry skin. He complains of painful varicose veins. He symptoms have persisted despite conservative management with leg elevation, exercise, attempted weight loss, over-the-counter medications (ibuprofen), and compression stocking use for more than 3 months. His symptoms are aggravated by weight bearing, prolonged standing, prolonged sitting. Nothing helps to alleviate patient's symptoms. His symptoms interfere with his ADLs such as work, shopping and housekeeping. His risk factors for venous disease are obesity, family history of venous disease, history of varicose veins. He stands for prolonged periods of time with the inability to take frequent breaks to elevate their legs. Previous treatment, vein procedures and vein surgeries include: wearing compression stockings for more than 3 months with little or no relief.

## 2024-08-16 NOTE — PHYSICAL EXAM
[Normal Rate and Rhythm] : normal rate and rhythm [2+] : left 2+ [Ankle Swelling (On Exam)] : present [Ankle Swelling On The Left] : of the left ankle [Varicose Veins Of Lower Extremities] : present [Varicose Veins Of The Left Leg] : of the left leg [Ankle Swelling On The Right] : mild [No Rash or Lesion] : No rash or lesion [Alert] : alert [Oriented to Person] : oriented to person [Oriented to Place] : oriented to place [Oriented to Time] : oriented to time [Calm] : calm [de-identified] : NAD [de-identified] : WNL

## 2024-08-16 NOTE — ASSESSMENT
[FreeTextEntry1] : 59M with persistent left lower extremity venous insufficiency, CEAP classification C3 which is unresponsive to at least 3 months of compression stockings 20-30 mmHg, leg elevation, exercise and over the counter pain medication_(Ibuprofen). Patient has complaints of worsening leg discomfort with swelling, fatigue, heaviness, achiness, cramping, and painful varicosities. The patients symptoms interfere with their ADLs, such as their ability to work and exercise. Patient has intact pulses in both legs without evidence of arterial insufficiency.  Treatment is indicated for symptomatic venous reflux disease with symptoms which is refractory to conservative therapy. Venous duplex study demonstrates bilateral lower extremity venous insufficiency. The need for definitive effective treatment is based on severe, interfering and worsening reflux symptoms with evidence of venous insufficiency on venous ultrasound. Patient is a candidate for endovenous ablation treatment of the left GSV and staged phlebectomy and ultrasound guided sclerotherapy   The risks, benefits and alternatives treatment versus continued conservative management were discussed with the patient. Patient chooses treatments. Treatment plan to be scheduled.

## 2024-08-16 NOTE — PHYSICAL EXAM
[Normal Rate and Rhythm] : normal rate and rhythm [2+] : left 2+ [Ankle Swelling (On Exam)] : present [Ankle Swelling On The Left] : of the left ankle [Varicose Veins Of Lower Extremities] : present [Varicose Veins Of The Left Leg] : of the left leg [Ankle Swelling On The Right] : mild [No Rash or Lesion] : No rash or lesion [Alert] : alert [Oriented to Person] : oriented to person [Oriented to Place] : oriented to place [Oriented to Time] : oriented to time [Calm] : calm [de-identified] : NAD [de-identified] : WNL

## 2024-08-16 NOTE — HISTORY OF PRESENT ILLNESS
[FreeTextEntry1] : 59M who presents for initial evaluation of left leg pain for the past 24 months.  Leg discomfort is associated with leg swelling, fatigue, heaviness, achiness, restlessness, muscle cramping, itchiness and dry skin. He complains of painful varicose veins. He symptoms have persisted despite conservative management with leg elevation, exercise, attempted weight loss, over-the-counter medications (ibuprofen), and compression stocking use for more than 3 months. His symptoms are aggravated by weight bearing, prolonged standing, prolonged sitting. Nothing helps to alleviate patient's symptoms. His symptoms interfere with his ADLs such as work, shopping and housekeeping. His risk factors for venous disease are obesity, family history of venous disease, history of varicose veins. He stands for prolonged periods of time with the inability to take frequent breaks to elevate their legs. Previous treatment, vein procedures and vein surgeries include: wearing compression stockings for more than 3 months with little or no relief.  2023

## 2024-11-19 NOTE — ED PROVIDER NOTE - ADMIT DISPOSITION PRESENT ON ADMISSION SEPSIS
Condition:: Cherry angiomas
Please Describe Your Condition:: The patient is wondering if it is possible to remove red blood vessel small growths from his forehead. No symptoms, no unusual one.
No